# Patient Record
Sex: FEMALE | Race: BLACK OR AFRICAN AMERICAN | NOT HISPANIC OR LATINO
[De-identification: names, ages, dates, MRNs, and addresses within clinical notes are randomized per-mention and may not be internally consistent; named-entity substitution may affect disease eponyms.]

---

## 2017-01-09 ENCOUNTER — APPOINTMENT (OUTPATIENT)
Dept: OBGYN | Facility: CLINIC | Age: 52
End: 2017-01-09

## 2017-01-31 ENCOUNTER — APPOINTMENT (OUTPATIENT)
Dept: OBGYN | Facility: CLINIC | Age: 52
End: 2017-01-31

## 2017-02-03 ENCOUNTER — APPOINTMENT (OUTPATIENT)
Dept: OBGYN | Facility: CLINIC | Age: 52
End: 2017-02-03

## 2017-12-11 ENCOUNTER — APPOINTMENT (OUTPATIENT)
Dept: INTERNAL MEDICINE | Facility: CLINIC | Age: 52
End: 2017-12-11
Payer: COMMERCIAL

## 2017-12-11 ENCOUNTER — LABORATORY RESULT (OUTPATIENT)
Age: 52
End: 2017-12-11

## 2017-12-11 VITALS
WEIGHT: 209 LBS | BODY MASS INDEX: 33.59 KG/M2 | OXYGEN SATURATION: 99 % | DIASTOLIC BLOOD PRESSURE: 76 MMHG | HEART RATE: 80 BPM | HEIGHT: 66 IN | TEMPERATURE: 97.8 F | SYSTOLIC BLOOD PRESSURE: 136 MMHG

## 2017-12-11 DIAGNOSIS — Z86.2 PERSONAL HISTORY OF DISEASES OF THE BLOOD AND BLOOD-FORMING ORGANS AND CERTAIN DISORDERS INVOLVING THE IMMUNE MECHANISM: ICD-10-CM

## 2017-12-11 DIAGNOSIS — J45.901 UNSPECIFIED ASTHMA WITH (ACUTE) EXACERBATION: ICD-10-CM

## 2017-12-11 DIAGNOSIS — M25.473 EFFUSION, UNSPECIFIED ANKLE: ICD-10-CM

## 2017-12-11 DIAGNOSIS — Z86.79 PERSONAL HISTORY OF OTHER DISEASES OF THE CIRCULATORY SYSTEM: ICD-10-CM

## 2017-12-11 DIAGNOSIS — Z86.73 PERSONAL HISTORY OF TRANSIENT ISCHEMIC ATTACK (TIA), AND CEREBRAL INFARCTION W/OUT RESIDUAL DEFICITS: ICD-10-CM

## 2017-12-11 DIAGNOSIS — Z87.2 PERSONAL HISTORY OF DISEASES OF THE SKIN AND SUBCUTANEOUS TISSUE: ICD-10-CM

## 2017-12-11 DIAGNOSIS — Z78.9 OTHER SPECIFIED HEALTH STATUS: ICD-10-CM

## 2017-12-11 DIAGNOSIS — R68.89 OTHER GENERAL SYMPTOMS AND SIGNS: ICD-10-CM

## 2017-12-11 DIAGNOSIS — J45.909 UNSPECIFIED ASTHMA, UNCOMPLICATED: ICD-10-CM

## 2017-12-11 DIAGNOSIS — Z87.09 PERSONAL HISTORY OF OTHER DISEASES OF THE RESPIRATORY SYSTEM: ICD-10-CM

## 2017-12-11 PROBLEM — Z00.00 ENCOUNTER FOR PREVENTIVE HEALTH EXAMINATION: Noted: 2017-12-11

## 2017-12-11 PROCEDURE — 99204 OFFICE O/P NEW MOD 45 MIN: CPT | Mod: 25

## 2017-12-11 PROCEDURE — 87880 STREP A ASSAY W/OPTIC: CPT | Mod: QW

## 2017-12-11 PROCEDURE — 36415 COLL VENOUS BLD VENIPUNCTURE: CPT

## 2017-12-11 PROCEDURE — 87804 INFLUENZA ASSAY W/OPTIC: CPT | Mod: QW

## 2017-12-11 PROCEDURE — 94010 BREATHING CAPACITY TEST: CPT

## 2017-12-11 RX ORDER — ALBUTEROL 90 MCG
AEROSOL (GRAM) INHALATION
Refills: 0 | Status: ACTIVE | COMMUNITY

## 2017-12-12 LAB
ALBUMIN SERPL ELPH-MCNC: 4 G/DL
ALP BLD-CCNC: 68 U/L
ALT SERPL-CCNC: 13 U/L
ANION GAP SERPL CALC-SCNC: 13 MMOL/L
AST SERPL-CCNC: 18 U/L
BASOPHILS # BLD AUTO: 0.05 K/UL
BASOPHILS NFR BLD AUTO: 0.6 %
BILIRUB SERPL-MCNC: 0.6 MG/DL
BUN SERPL-MCNC: 10 MG/DL
CALCIUM SERPL-MCNC: 10 MG/DL
CHLORIDE SERPL-SCNC: 104 MMOL/L
CO2 SERPL-SCNC: 26 MMOL/L
CREAT SERPL-MCNC: 0.99 MG/DL
EOSINOPHIL # BLD AUTO: 0.42 K/UL
EOSINOPHIL NFR BLD AUTO: 5.1 %
ERYTHROCYTE [SEDIMENTATION RATE] IN BLOOD BY WESTERGREN METHOD: 61 MM/HR
FLUAV SPEC QL CULT: NEGATIVE
FLUBV AG SPEC QL IA: NEGATIVE
GLUCOSE SERPL-MCNC: 81 MG/DL
HCT VFR BLD CALC: 33.1 %
HGB BLD-MCNC: 10 G/DL
IMM GRANULOCYTES NFR BLD AUTO: 0.1 %
LYMPHOCYTES # BLD AUTO: 3.9 K/UL
LYMPHOCYTES NFR BLD AUTO: 46.9 %
MAN DIFF?: NORMAL
MCHC RBC-ENTMCNC: 22.6 PG
MCHC RBC-ENTMCNC: 30.2 GM/DL
MCV RBC AUTO: 74.9 FL
MONOCYTES # BLD AUTO: 0.49 K/UL
MONOCYTES NFR BLD AUTO: 5.9 %
NEUTROPHILS # BLD AUTO: 3.44 K/UL
NEUTROPHILS NFR BLD AUTO: 41.4 %
PLATELET # BLD AUTO: 404 K/UL
POTASSIUM SERPL-SCNC: 4.1 MMOL/L
PROT SERPL-MCNC: 8.2 G/DL
RBC # BLD: 4.42 M/UL
RBC # FLD: 16.8 %
S PYO AG SPEC QL IA: NEGATIVE
SAVE SPECIMEN: NORMAL
SODIUM SERPL-SCNC: 143 MMOL/L
WBC # FLD AUTO: 8.31 K/UL

## 2017-12-14 LAB — BACTERIA THROAT CULT: NORMAL

## 2017-12-18 ENCOUNTER — APPOINTMENT (OUTPATIENT)
Dept: INTERNAL MEDICINE | Facility: CLINIC | Age: 52
End: 2017-12-18
Payer: COMMERCIAL

## 2017-12-18 VITALS
SYSTOLIC BLOOD PRESSURE: 134 MMHG | HEART RATE: 80 BPM | OXYGEN SATURATION: 98 % | BODY MASS INDEX: 33.59 KG/M2 | TEMPERATURE: 97.6 F | WEIGHT: 209 LBS | HEIGHT: 66 IN | DIASTOLIC BLOOD PRESSURE: 82 MMHG

## 2017-12-18 DIAGNOSIS — Z09 ENCOUNTER FOR FOLLOW-UP EXAMINATION AFTER COMPLETED TREATMENT FOR CONDITIONS OTHER THAN MALIGNANT NEOPLASM: ICD-10-CM

## 2017-12-18 DIAGNOSIS — J45.909 UNSPECIFIED ASTHMA, UNCOMPLICATED: ICD-10-CM

## 2017-12-18 PROCEDURE — 99213 OFFICE O/P EST LOW 20 MIN: CPT

## 2017-12-18 RX ORDER — AZITHROMYCIN 250 MG/1
250 TABLET, FILM COATED ORAL
Qty: 1 | Refills: 0 | Status: DISCONTINUED | COMMUNITY
Start: 2017-12-11 | End: 2017-12-18

## 2017-12-18 RX ORDER — PREDNISONE 10 MG/1
10 TABLET ORAL
Qty: 10 | Refills: 0 | Status: DISCONTINUED | COMMUNITY
Start: 2017-12-11 | End: 2017-12-18

## 2018-01-22 ENCOUNTER — APPOINTMENT (OUTPATIENT)
Dept: OTOLARYNGOLOGY | Facility: CLINIC | Age: 53
End: 2018-01-22
Payer: COMMERCIAL

## 2018-01-22 VITALS
HEIGHT: 65.5 IN | HEART RATE: 77 BPM | DIASTOLIC BLOOD PRESSURE: 53 MMHG | BODY MASS INDEX: 33.42 KG/M2 | SYSTOLIC BLOOD PRESSURE: 116 MMHG | WEIGHT: 203 LBS

## 2018-01-22 DIAGNOSIS — Z86.2 PERSONAL HISTORY OF DISEASES OF THE BLOOD AND BLOOD-FORMING ORGANS AND CERTAIN DISORDERS INVOLVING THE IMMUNE MECHANISM: ICD-10-CM

## 2018-01-22 DIAGNOSIS — H60.392 OTHER INFECTIVE OTITIS EXTERNA, LEFT EAR: ICD-10-CM

## 2018-01-22 DIAGNOSIS — Z83.3 FAMILY HISTORY OF DIABETES MELLITUS: ICD-10-CM

## 2018-01-22 DIAGNOSIS — H61.23 IMPACTED CERUMEN, BILATERAL: ICD-10-CM

## 2018-01-22 DIAGNOSIS — Z87.09 PERSONAL HISTORY OF OTHER DISEASES OF THE RESPIRATORY SYSTEM: ICD-10-CM

## 2018-01-22 DIAGNOSIS — H92.09 OTALGIA, UNSPECIFIED EAR: ICD-10-CM

## 2018-01-22 PROCEDURE — 99203 OFFICE O/P NEW LOW 30 MIN: CPT

## 2018-12-14 ENCOUNTER — OUTPATIENT (OUTPATIENT)
Dept: OUTPATIENT SERVICES | Facility: HOSPITAL | Age: 53
LOS: 1 days | End: 2018-12-14
Payer: COMMERCIAL

## 2018-12-14 ENCOUNTER — APPOINTMENT (OUTPATIENT)
Dept: RADIOLOGY | Facility: CLINIC | Age: 53
End: 2018-12-14
Payer: COMMERCIAL

## 2018-12-14 DIAGNOSIS — Z98.89 OTHER SPECIFIED POSTPROCEDURAL STATES: Chronic | ICD-10-CM

## 2018-12-14 DIAGNOSIS — Z00.8 ENCOUNTER FOR OTHER GENERAL EXAMINATION: ICD-10-CM

## 2018-12-14 PROCEDURE — 71046 X-RAY EXAM CHEST 2 VIEWS: CPT

## 2018-12-14 PROCEDURE — 71046 X-RAY EXAM CHEST 2 VIEWS: CPT | Mod: 26

## 2018-12-20 ENCOUNTER — LABORATORY RESULT (OUTPATIENT)
Age: 53
End: 2018-12-20

## 2018-12-20 ENCOUNTER — APPOINTMENT (OUTPATIENT)
Dept: INFECTIOUS DISEASE | Facility: CLINIC | Age: 53
End: 2018-12-20
Payer: COMMERCIAL

## 2018-12-20 VITALS
SYSTOLIC BLOOD PRESSURE: 149 MMHG | DIASTOLIC BLOOD PRESSURE: 86 MMHG | HEART RATE: 81 BPM | TEMPERATURE: 97.7 F | HEIGHT: 65 IN | OXYGEN SATURATION: 98 %

## 2018-12-20 DIAGNOSIS — R50.9 FEVER, UNSPECIFIED: ICD-10-CM

## 2018-12-20 DIAGNOSIS — M13.0 POLYARTHRITIS, UNSPECIFIED: ICD-10-CM

## 2018-12-20 DIAGNOSIS — A69.20 LYME DISEASE, UNSPECIFIED: ICD-10-CM

## 2018-12-20 DIAGNOSIS — R53.83 OTHER FATIGUE: ICD-10-CM

## 2018-12-20 PROCEDURE — 99205 OFFICE O/P NEW HI 60 MIN: CPT

## 2018-12-26 LAB
A PHAGOCYTOPH IGG TITR SER IF: NORMAL TITER
ALBUMIN SERPL ELPH-MCNC: 4.5 G/DL
ALP BLD-CCNC: 75 U/L
ALT SERPL-CCNC: 16 U/L
ANA SER IF-ACNC: NEGATIVE
ANION GAP SERPL CALC-SCNC: 12 MMOL/L
AST SERPL-CCNC: 17 U/L
B BURGDOR AB SER QL IA: NEGATIVE
B BURGDOR AB SER-IMP: NEGATIVE
B BURGDOR IGG+IGM SER QL IB: NORMAL
B BURGDOR IGM PATRN SER IB-IMP: POSITIVE
B BURGDOR18/20KD IGM SER QL IB: NORMAL
B BURGDOR18KD IGG SER QL IB: NORMAL
B BURGDOR23KD IGG SER QL IB: NORMAL
B BURGDOR23KD IGM SER QL IB: PRESENT
B BURGDOR28KD AB SER QL IB: NORMAL
B BURGDOR28KD IGG SER QL IB: NORMAL
B BURGDOR30KD AB SER QL IB: NORMAL
B BURGDOR30KD IGG SER QL IB: NORMAL
B BURGDOR31KD IGG SER QL IB: NORMAL
B BURGDOR31KD IGM SER QL IB: NORMAL
B BURGDOR39KD IGG SER QL IB: NORMAL
B BURGDOR39KD IGM SER QL IB: PRESENT
B BURGDOR41KD IGG SER QL IB: NORMAL
B BURGDOR41KD IGM SER QL IB: NORMAL
B BURGDOR45KD AB SER QL IB: NORMAL
B BURGDOR45KD IGG SER QL IB: NORMAL
B BURGDOR58KD AB SER QL IB: NORMAL
B BURGDOR58KD IGG SER QL IB: NORMAL
B BURGDOR66KD IGG SER QL IB: NORMAL
B BURGDOR66KD IGM SER QL IB: NORMAL
B BURGDOR93KD IGG SER QL IB: NORMAL
B BURGDOR93KD IGM SER QL IB: NORMAL
B MICROTI IGG TITR SER: NORMAL TITER
BACTERIA BLD CULT: NORMAL
BACTERIA BLD CULT: NORMAL
BASOPHILS # BLD AUTO: 0.03 K/UL
BASOPHILS NFR BLD AUTO: 0.4 %
BILIRUB SERPL-MCNC: 0.6 MG/DL
BUN SERPL-MCNC: 9 MG/DL
C3 SERPL-MCNC: 186 MG/DL
C4 SERPL-MCNC: 46 MG/DL
CALCIUM SERPL-MCNC: 9.9 MG/DL
CHLORIDE SERPL-SCNC: 103 MMOL/L
CO2 SERPL-SCNC: 26 MMOL/L
CREAT SERPL-MCNC: 0.94 MG/DL
E CHAFFEENSIS IGG TITR SER IF: NORMAL TITER
EOSINOPHIL # BLD AUTO: 0.21 K/UL
EOSINOPHIL NFR BLD AUTO: 3 %
GLUCOSE SERPL-MCNC: 90 MG/DL
HCT VFR BLD CALC: 37.7 %
HGB BLD-MCNC: 12.1 G/DL
IMM GRANULOCYTES NFR BLD AUTO: 0.1 %
LYMPHOCYTES # BLD AUTO: 3.43 K/UL
LYMPHOCYTES NFR BLD AUTO: 49.1 %
MAN DIFF?: NORMAL
MCHC RBC-ENTMCNC: 26.1 PG
MCHC RBC-ENTMCNC: 32.1 GM/DL
MCV RBC AUTO: 81.4 FL
MONOCYTES # BLD AUTO: 0.37 K/UL
MONOCYTES NFR BLD AUTO: 5.3 %
NEUTROPHILS # BLD AUTO: 2.93 K/UL
NEUTROPHILS NFR BLD AUTO: 42.1 %
PLATELET # BLD AUTO: 308 K/UL
POTASSIUM SERPL-SCNC: 3.8 MMOL/L
PROT SERPL-MCNC: 8 G/DL
R RICKETTSI IGG CSF-ACNC: NEGATIVE
R RICKETTSI IGM CSF-ACNC: 0.71 INDEX
RBC # BLD: 4.63 M/UL
RBC # FLD: 16.2 %
RHEUMATOID FACT SER QL: 10 IU/ML
SODIUM SERPL-SCNC: 142 MMOL/L
WBC # FLD AUTO: 6.98 K/UL

## 2019-01-04 LAB — B BURGDOR DNA SPEC QL NAA+PROBE: NEGATIVE

## 2019-04-30 ENCOUNTER — APPOINTMENT (OUTPATIENT)
Dept: OBGYN | Facility: CLINIC | Age: 54
End: 2019-04-30
Payer: COMMERCIAL

## 2019-04-30 VITALS
BODY MASS INDEX: 34.66 KG/M2 | SYSTOLIC BLOOD PRESSURE: 143 MMHG | HEIGHT: 65 IN | DIASTOLIC BLOOD PRESSURE: 82 MMHG | HEART RATE: 72 BPM | WEIGHT: 208 LBS

## 2019-04-30 DIAGNOSIS — Z01.419 ENCOUNTER FOR GYNECOLOGICAL EXAMINATION (GENERAL) (ROUTINE) W/OUT ABNORMAL FINDINGS: ICD-10-CM

## 2019-04-30 DIAGNOSIS — Z12.31 ENCOUNTER FOR SCREENING MAMMOGRAM FOR MALIGNANT NEOPLASM OF BREAST: ICD-10-CM

## 2019-04-30 DIAGNOSIS — Z12.83 ENCOUNTER FOR SCREENING FOR MALIGNANT NEOPLASM OF SKIN: ICD-10-CM

## 2019-04-30 PROCEDURE — 99396 PREV VISIT EST AGE 40-64: CPT

## 2019-04-30 NOTE — HISTORY OF PRESENT ILLNESS
[Fair] : being in fair health [Last Mammogram ___] : Last Mammogram was [unfilled] [Last Bone Density ___] : Last bone density studies [unfilled] [Last Colonoscopy ___] : Last colonoscopy [unfilled] [Last Pap ___] : Last cervical pap smear was [unfilled] [Perimenopausal] : is perimenopausal [HPV Vaccine NA Due to Age] : HPV vaccine not available to patient due to age

## 2019-04-30 NOTE — PHYSICAL EXAM
[Awake] : awake [Alert] : alert [Acute Distress] : no acute distress [Mass] : no breast mass [Nipple Discharge] : no nipple discharge [Axillary LAD] : no axillary lymphadenopathy [Soft] : soft [Tender] : non tender [Oriented x3] : oriented to person, place, and time [Normal] : uterus [Scant] : there was scant vaginal bleeding [Uterine Adnexae] : were not tender and not enlarged [FreeTextEntry5] : stenotic

## 2019-04-30 NOTE — CHIEF COMPLAINT
[Annual Visit] : annual visit [FreeTextEntry1] : 58 yo sister stage 3 breast ca--3rd sister--other 2 in 40's\par Pt to see breast MD--interested in RRS

## 2019-05-01 LAB — HPV HIGH+LOW RISK DNA PNL CVX: NOT DETECTED

## 2019-05-05 LAB — CYTOLOGY CVX/VAG DOC THIN PREP: NORMAL

## 2019-09-02 PROBLEM — Z09 FOLLOW UP: Status: ACTIVE | Noted: 2017-12-18

## 2019-10-14 ENCOUNTER — INPATIENT (INPATIENT)
Facility: HOSPITAL | Age: 54
LOS: 4 days | Discharge: HOME | DRG: 563 | End: 2019-10-19
Attending: SURGERY | Admitting: SURGERY
Payer: COMMERCIAL

## 2019-10-14 VITALS
DIASTOLIC BLOOD PRESSURE: 98 MMHG | SYSTOLIC BLOOD PRESSURE: 186 MMHG | HEART RATE: 90 BPM | RESPIRATION RATE: 22 BRPM | OXYGEN SATURATION: 98 %

## 2019-10-14 DIAGNOSIS — Z98.89 OTHER SPECIFIED POSTPROCEDURAL STATES: Chronic | ICD-10-CM
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
## 2019-10-14 DIAGNOSIS — Z98.891 HISTORY OF UTERINE SCAR FROM PREVIOUS SURGERY: Chronic | ICD-10-CM

## 2019-10-14 LAB
ALBUMIN SERPL ELPH-MCNC: 4.1 G/DL — SIGNIFICANT CHANGE UP (ref 3.3–5)
ALP SERPL-CCNC: 77 U/L — SIGNIFICANT CHANGE UP (ref 40–120)
ALT FLD-CCNC: 21 U/L — SIGNIFICANT CHANGE UP (ref 10–45)
ANION GAP SERPL CALC-SCNC: 13 MMOL/L — SIGNIFICANT CHANGE UP (ref 5–17)
APPEARANCE UR: CLEAR — SIGNIFICANT CHANGE UP
APTT BLD: 28.6 SEC — SIGNIFICANT CHANGE UP (ref 27.5–36.3)
AST SERPL-CCNC: 29 U/L — SIGNIFICANT CHANGE UP (ref 10–40)
BASOPHILS # BLD AUTO: 0 K/UL — SIGNIFICANT CHANGE UP (ref 0–0.2)
BASOPHILS NFR BLD AUTO: 0 % — SIGNIFICANT CHANGE UP (ref 0–2)
BILIRUB SERPL-MCNC: 0.7 MG/DL — SIGNIFICANT CHANGE UP (ref 0.2–1.2)
BILIRUB UR-MCNC: NEGATIVE — SIGNIFICANT CHANGE UP
BUN SERPL-MCNC: 10 MG/DL — SIGNIFICANT CHANGE UP (ref 7–23)
CALCIUM SERPL-MCNC: 9.5 MG/DL — SIGNIFICANT CHANGE UP (ref 8.4–10.5)
CHLORIDE SERPL-SCNC: 104 MMOL/L — SIGNIFICANT CHANGE UP (ref 96–108)
CO2 SERPL-SCNC: 23 MMOL/L — SIGNIFICANT CHANGE UP (ref 22–31)
COLOR SPEC: COLORLESS — SIGNIFICANT CHANGE UP
CREAT SERPL-MCNC: 0.77 MG/DL — SIGNIFICANT CHANGE UP (ref 0.5–1.3)
DIFF PNL FLD: NEGATIVE — SIGNIFICANT CHANGE UP
EOSINOPHIL # BLD AUTO: 0.1 K/UL — SIGNIFICANT CHANGE UP (ref 0–0.5)
EOSINOPHIL NFR BLD AUTO: 1 % — SIGNIFICANT CHANGE UP (ref 0–6)
ETHANOL SERPL-MCNC: SIGNIFICANT CHANGE UP MG/DL (ref 0–10)
GAS PNL BLDV: SIGNIFICANT CHANGE UP
GLUCOSE BLDC GLUCOMTR-MCNC: 193 MG/DL — HIGH (ref 70–99)
GLUCOSE BLDC GLUCOMTR-MCNC: 248 MG/DL — HIGH (ref 70–99)
GLUCOSE SERPL-MCNC: 171 MG/DL — HIGH (ref 70–99)
GLUCOSE UR QL: NEGATIVE — SIGNIFICANT CHANGE UP
HCT VFR BLD CALC: 37.8 % — SIGNIFICANT CHANGE UP (ref 34.5–45)
HGB BLD-MCNC: 12.8 G/DL — SIGNIFICANT CHANGE UP (ref 11.5–15.5)
INR BLD: 1.04 RATIO — SIGNIFICANT CHANGE UP (ref 0.88–1.16)
KETONES UR-MCNC: NEGATIVE — SIGNIFICANT CHANGE UP
LEUKOCYTE ESTERASE UR-ACNC: NEGATIVE — SIGNIFICANT CHANGE UP
LIDOCAIN IGE QN: 17 U/L — SIGNIFICANT CHANGE UP (ref 7–60)
LYMPHOCYTES # BLD AUTO: 5.17 K/UL — HIGH (ref 1–3.3)
LYMPHOCYTES # BLD AUTO: 54 % — HIGH (ref 13–44)
MCHC RBC-ENTMCNC: 27.8 PG — SIGNIFICANT CHANGE UP (ref 27–34)
MCHC RBC-ENTMCNC: 33.9 GM/DL — SIGNIFICANT CHANGE UP (ref 32–36)
MCV RBC AUTO: 82.2 FL — SIGNIFICANT CHANGE UP (ref 80–100)
MONOCYTES # BLD AUTO: 0.67 K/UL — SIGNIFICANT CHANGE UP (ref 0–0.9)
MONOCYTES NFR BLD AUTO: 7 % — SIGNIFICANT CHANGE UP (ref 2–14)
NEUTROPHILS # BLD AUTO: 3.64 K/UL — SIGNIFICANT CHANGE UP (ref 1.8–7.4)
NEUTROPHILS NFR BLD AUTO: 38 % — LOW (ref 43–77)
NITRITE UR-MCNC: NEGATIVE — SIGNIFICANT CHANGE UP
PH UR: 7 — SIGNIFICANT CHANGE UP (ref 5–8)
PLATELET # BLD AUTO: 261 K/UL — SIGNIFICANT CHANGE UP (ref 150–400)
POTASSIUM SERPL-MCNC: 3.8 MMOL/L — SIGNIFICANT CHANGE UP (ref 3.5–5.3)
POTASSIUM SERPL-SCNC: 3.8 MMOL/L — SIGNIFICANT CHANGE UP (ref 3.5–5.3)
PROT SERPL-MCNC: 7.8 G/DL — SIGNIFICANT CHANGE UP (ref 6–8.3)
PROT UR-MCNC: NEGATIVE — SIGNIFICANT CHANGE UP
PROTHROM AB SERPL-ACNC: 12 SEC — SIGNIFICANT CHANGE UP (ref 10–12.9)
RBC # BLD: 4.6 M/UL — SIGNIFICANT CHANGE UP (ref 3.8–5.2)
RBC # FLD: 14.5 % — SIGNIFICANT CHANGE UP (ref 10.3–14.5)
SODIUM SERPL-SCNC: 140 MMOL/L — SIGNIFICANT CHANGE UP (ref 135–145)
SP GR SPEC: 1.04 — HIGH (ref 1.01–1.02)
UROBILINOGEN FLD QL: NEGATIVE — SIGNIFICANT CHANGE UP
WBC # BLD: 9.57 K/UL — SIGNIFICANT CHANGE UP (ref 3.8–10.5)
WBC # FLD AUTO: 9.57 K/UL — SIGNIFICANT CHANGE UP (ref 3.8–10.5)

## 2019-10-14 PROCEDURE — 73110 X-RAY EXAM OF WRIST: CPT | Mod: 26,LT

## 2019-10-14 PROCEDURE — 73564 X-RAY EXAM KNEE 4 OR MORE: CPT | Mod: 26,50

## 2019-10-14 PROCEDURE — 72125 CT NECK SPINE W/O DYE: CPT | Mod: 26

## 2019-10-14 PROCEDURE — 70450 CT HEAD/BRAIN W/O DYE: CPT | Mod: 26

## 2019-10-14 PROCEDURE — 74177 CT ABD & PELVIS W/CONTRAST: CPT | Mod: 26

## 2019-10-14 PROCEDURE — 71045 X-RAY EXAM CHEST 1 VIEW: CPT | Mod: 26

## 2019-10-14 PROCEDURE — 73610 X-RAY EXAM OF ANKLE: CPT | Mod: 26,LT,77

## 2019-10-14 PROCEDURE — 73030 X-RAY EXAM OF SHOULDER: CPT | Mod: 26,RT

## 2019-10-14 PROCEDURE — 73590 X-RAY EXAM OF LOWER LEG: CPT | Mod: 26,LT

## 2019-10-14 PROCEDURE — 72170 X-RAY EXAM OF PELVIS: CPT | Mod: 26

## 2019-10-14 PROCEDURE — 73130 X-RAY EXAM OF HAND: CPT | Mod: 26,LT

## 2019-10-14 PROCEDURE — 71260 CT THORAX DX C+: CPT | Mod: 26

## 2019-10-14 PROCEDURE — 73630 X-RAY EXAM OF FOOT: CPT | Mod: 26,LT

## 2019-10-14 PROCEDURE — 73610 X-RAY EXAM OF ANKLE: CPT | Mod: 26,LT

## 2019-10-14 RX ORDER — DEXTROSE 50 % IN WATER 50 %
25 SYRINGE (ML) INTRAVENOUS ONCE
Refills: 0 | Status: DISCONTINUED | OUTPATIENT
Start: 2019-10-14 | End: 2019-10-19

## 2019-10-14 RX ORDER — ACETAMINOPHEN 500 MG
975 TABLET ORAL EVERY 6 HOURS
Refills: 0 | Status: COMPLETED | OUTPATIENT
Start: 2019-10-14 | End: 2019-10-17

## 2019-10-14 RX ORDER — ENOXAPARIN SODIUM 100 MG/ML
40 INJECTION SUBCUTANEOUS DAILY
Refills: 0 | Status: DISCONTINUED | OUTPATIENT
Start: 2019-10-14 | End: 2019-10-19

## 2019-10-14 RX ORDER — INFLUENZA VIRUS VACCINE 15; 15; 15; 15 UG/.5ML; UG/.5ML; UG/.5ML; UG/.5ML
0.5 SUSPENSION INTRAMUSCULAR ONCE
Refills: 0 | Status: DISCONTINUED | OUTPATIENT
Start: 2019-10-14 | End: 2019-10-19

## 2019-10-14 RX ORDER — SODIUM CHLORIDE 9 MG/ML
1000 INJECTION, SOLUTION INTRAVENOUS
Refills: 0 | Status: DISCONTINUED | OUTPATIENT
Start: 2019-10-14 | End: 2019-10-15

## 2019-10-14 RX ORDER — ACETAMINOPHEN 500 MG
1000 TABLET ORAL ONCE
Refills: 0 | Status: COMPLETED | OUTPATIENT
Start: 2019-10-14 | End: 2019-10-14

## 2019-10-14 RX ORDER — IBUPROFEN 200 MG
400 TABLET ORAL EVERY 6 HOURS
Refills: 0 | Status: DISCONTINUED | OUTPATIENT
Start: 2019-10-14 | End: 2019-10-16

## 2019-10-14 RX ORDER — DEXTROSE 50 % IN WATER 50 %
12.5 SYRINGE (ML) INTRAVENOUS ONCE
Refills: 0 | Status: DISCONTINUED | OUTPATIENT
Start: 2019-10-14 | End: 2019-10-19

## 2019-10-14 RX ORDER — TETANUS TOXOID, REDUCED DIPHTHERIA TOXOID AND ACELLULAR PERTUSSIS VACCINE, ADSORBED 5; 2.5; 8; 8; 2.5 [IU]/.5ML; [IU]/.5ML; UG/.5ML; UG/.5ML; UG/.5ML
0.5 SUSPENSION INTRAMUSCULAR ONCE
Refills: 0 | Status: COMPLETED | OUTPATIENT
Start: 2019-10-14 | End: 2019-10-14

## 2019-10-14 RX ORDER — FENTANYL CITRATE 50 UG/ML
25 INJECTION INTRAVENOUS ONCE
Refills: 0 | Status: DISCONTINUED | OUTPATIENT
Start: 2019-10-14 | End: 2019-10-14

## 2019-10-14 RX ORDER — DEXTROSE 50 % IN WATER 50 %
15 SYRINGE (ML) INTRAVENOUS ONCE
Refills: 0 | Status: DISCONTINUED | OUTPATIENT
Start: 2019-10-14 | End: 2019-10-19

## 2019-10-14 RX ORDER — INSULIN LISPRO 100/ML
VIAL (ML) SUBCUTANEOUS
Refills: 0 | Status: DISCONTINUED | OUTPATIENT
Start: 2019-10-14 | End: 2019-10-19

## 2019-10-14 RX ORDER — GLUCAGON INJECTION, SOLUTION 0.5 MG/.1ML
1 INJECTION, SOLUTION SUBCUTANEOUS ONCE
Refills: 0 | Status: DISCONTINUED | OUTPATIENT
Start: 2019-10-14 | End: 2019-10-19

## 2019-10-14 RX ORDER — SODIUM CHLORIDE 9 MG/ML
1000 INJECTION INTRAMUSCULAR; INTRAVENOUS; SUBCUTANEOUS ONCE
Refills: 0 | Status: COMPLETED | OUTPATIENT
Start: 2019-10-14 | End: 2019-10-14

## 2019-10-14 RX ADMIN — Medication 975 MILLIGRAM(S): at 23:52

## 2019-10-14 RX ADMIN — Medication 2: at 23:50

## 2019-10-14 RX ADMIN — ENOXAPARIN SODIUM 40 MILLIGRAM(S): 100 INJECTION SUBCUTANEOUS at 23:50

## 2019-10-14 RX ADMIN — Medication 1000 MILLIGRAM(S): at 18:29

## 2019-10-14 RX ADMIN — Medication 400 MILLIGRAM(S): at 23:51

## 2019-10-14 RX ADMIN — SODIUM CHLORIDE 1000 MILLILITER(S): 9 INJECTION INTRAMUSCULAR; INTRAVENOUS; SUBCUTANEOUS at 17:03

## 2019-10-14 RX ADMIN — TETANUS TOXOID, REDUCED DIPHTHERIA TOXOID AND ACELLULAR PERTUSSIS VACCINE, ADSORBED 0.5 MILLILITER(S): 5; 2.5; 8; 8; 2.5 SUSPENSION INTRAMUSCULAR at 17:05

## 2019-10-14 RX ADMIN — FENTANYL CITRATE 25 MICROGRAM(S): 50 INJECTION INTRAVENOUS at 19:37

## 2019-10-14 NOTE — ED PROCEDURE NOTE - CPROC ED POST PROC CARE GUIDE1
Instructed patient/caregiver regarding signs and symptoms of infection./Keep the cast/splint/dressing clean and dry./Verbal/written post procedure instructions were given to patient/caregiver./Instructed patient/caregiver to follow-up with primary care physician.
Instructed patient/caregiver to follow-up with primary care physician./Verbal/written post procedure instructions were given to patient/caregiver./Instructed patient/caregiver regarding signs and symptoms of infection./Keep the cast/splint/dressing clean and dry.
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
## 2019-10-14 NOTE — ED PROVIDER NOTE - NS ED ROS FT
ROS: denies HA, weakness, dizziness, fevers/chills, nausea/vomiting, chest pain, SOB, diaphoresis, abdominal pain, diarrhea, neuro deficits, dysuria/hematuria, rash    +HA, RUE/LLE pain, R shoulder pain, bilateral knee pain

## 2019-10-14 NOTE — ED ADULT NURSE NOTE - NSIMPLEMENTINTERV_GEN_ALL_ED
Implemented All Fall Risk Interventions:  [place] to call system. Call bell, personal items and telephone within reach. Instruct patient to call for assistance. Room bathroom lighting operational. Non-slip footwear when patient is off stretcher. Physically safe environment: no spills, clutter or unnecessary equipment. Stretcher in lowest position, wheels locked, appropriate side rails in place. Provide visual cue, wrist band, yellow gown, etc. Monitor gait and stability. Monitor for mental status changes and reorient to person, place, and time. Review medications for side effects contributing to fall risk. Reinforce activity limits and safety measures with patient and family.
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
## 2019-10-14 NOTE — H&P ADULT - NSHPLABSRESULTS_GEN_ALL_CORE
< from: CT Abdomen and Pelvis w/ IV Cont (10.14.19 @ 16:57) >    CHEST:     LUNGS AND LARGE AIRWAYS: No endobronchial lesions. Mild right-sided   dependent subsegmental atelectasis.  PLEURA: No pleural effusion.  VESSELS: Within normal limits. No pulmonary embolism or aortic dissection.  HEART: Heart size is normal. No pericardial effusion.  MEDIASTINUM AND MONI: No lymphadenopathy.  CHEST WALL AND LOWER NECK: 1.1 x 1.6 cm right and 0.9 x 1.1 cm left   thyroid solid nodules.    ABDOMEN AND PELVIS:    LIVER: Within normal limits.  BILE DUCTS: Normal caliber.  GALLBLADDER: Within normal limits.  SPLEEN: Within normal limits.  PANCREAS: Mild nonspecific fat stranding near the head of the pancreas.  ADRENALS: Within Normal limits.  KIDNEYS/URETERS: Within normal limits.    BLADDER: Within normal limits.  REPRODUCTIVE ORGANS: Uterus and adnexa within normal limits.    BOWEL: No bowel obstruction. Appendix is normal.  PERITONEUM: No ascites.  VESSELS: Within normal limits.  RETROPERITONEUM/LYMPH NODES: No lymphadenopathy.    ABDOMINAL WALL: Within normal limits.  BONES: Degenerative changes.    IMPRESSION:     No acute thoracic or abdominal pathology.      < end of copied text >    < from: CT Head No Cont (10.14.19 @ 16:52) >    IMPRESSION:    HEAD CT:     No evidence for intracranial hemorrhage, mass effect or midline shift.     No displaced calvarial fracture. Large left occipital scalp hematoma with   adjacent subcutaneous emphysema, likely laceration.    CERVICAL SPINECT:     No evidence for acute displaced fracture or traumatic malalignment. Mild   degenerative changes.    < end of copied text >    < from: Xray Pelvis AP only (10.14.19 @ 17:07) >    INTERPRETATION:  no acute fracture or dislocation    < end of copied text >    < from: Xray Shoulder 2 Views, Right (10.14.19 @ 17:06) >    INTERPRETATION:  No acute fracture or dislocation.    < from: Xray Knee 4 Views, Bilateral (10.14.19 @ 17:05) >      INTERPRETATION:  cortical irregularity of the left fibular head -   question acute non dispaced left fibular head fracture    < from: Xray Hand 3 Views, Left (10.14.19 @ 17:03) >    IMPRESSION:   No acute displaced fracture or dislocation is seen in the left wrist.   There is mild soft tissue swelling overlying the ulnar styloid region.   Apparent irregularity at the waist of the scaphoid seen on oblique view   may be projectional. If there is clinical concern for scaphoid fracture,   dedicated scaphoid view is recommended. The joint space between the   lunate and triquetrum bone is not well profiled, and arthrosis/partial   coalition is not excluded. There are likely cystic changes in the lunate   and triquetrum. If clinically indicated, follow-up with cross-sectional   imaging can be performed. No acute displaced fracture or dislocation is   seen in the left hand.    < end of copied text >

## 2019-10-14 NOTE — ED PROCEDURE NOTE - CPROC ED TIME OUT STATEMENT1
“Patient's name, , procedure and correct site were confirmed during the [place] Timeout.”
“Patient's name, , procedure and correct site were confirmed during the [place] Timeout.”
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
## 2019-10-14 NOTE — ED PROVIDER NOTE - PHYSICAL EXAMINATION
Gen: GCS15, speaking in full sentences  Head: laceration occipital region  HEENT: PERRL, MMM, normal conjunctiva, anicteric, neck supple  Lung: CTAB, no adventitious sounds  CV: RRR, no murmurs, rubs or gallops  Abd: soft, NTND, no rebound or guarding, no CVAT  MSK: TTP over R clavicle / R shoulder, TTP bilateral knees, TTP over L fingers, L midfoot  Neuro: No focal neurologic deficits. CN II-XII grossly intact. 5/5 strength and normal sensation in all extremities.  Skin: Warm and dry, no evidence of rash  Psych: normal mood and affect

## 2019-10-14 NOTE — ED PROCEDURE NOTE - CPROC ED LACERATION CLEANSED1
removal of particulate matter/copious irrigation/extensive cleaning
copious irrigation/extensive cleaning/removal of particulate matter

## 2019-10-14 NOTE — ED PROCEDURE NOTE - CPROC ED LACER REPAIR DETAIL1
No foreign body/Multiple flaps were aligned./All foreign material was removed./The wound was explored to base in bloodless field./Non-extensive debridement was performed.
Multiple flaps were aligned./Non-extensive debridement was performed./The wound was explored to base in bloodless field./All foreign material was removed.

## 2019-10-14 NOTE — ED PROCEDURE NOTE - ATTENDING CONTRIBUTION TO CARE
***Saroj Rachel MD FACEP*** Attending physician was available for the key components of the procedure, the patient tolerated well. There were no complications with the procedure.
Dr. JO-ANN Panchal assisted as well.  ***Saroj Rachel MD FACEP*** Attending physician was available for the key components of the procedure, the patient tolerated well. There were no complications with the procedure.
Agree with above, Saroj Rachel MD, FACEP  ***Saroj Rachel MD FACEP*** Attending physician was available for the key components of the procedure, the patient tolerated well. There were no complications with the procedure.

## 2019-10-14 NOTE — ED PROCEDURE NOTE - NS ED ATTENDING STATEMENT MOD
I have personally seen and examined this patient.  I have fully participated in the care of this patient. I have reviewed all pertinent clinical information, including history, physical exam, plan and the Resident’s note and agree except as noted.
I have personally seen and examined this patient.  I have fully participated in the care of this patient. I have reviewed all pertinent clinical information, including history, physical exam, plan and the Medical Student and Resident’s note and agree except as noted.
I have personally seen and examined this patient.  I have fully participated in the care of this patient. I have reviewed all pertinent clinical information, including history, physical exam, plan and the Medical Student and Resident’s note and agree except as noted.

## 2019-10-14 NOTE — ED PROCEDURE NOTE - PROCEDURE ADDITIONAL DETAILS
Two lacerations were repaired, one measuring 3.o cm and the other measuring 1.0 cm. Both were repaired with staples as detailed above. Two lacerations were repaired, one measuring 3.0 cm and the other measuring 1.0 cm. Both were repaired with staples as detailed above.

## 2019-10-15 DIAGNOSIS — E11.9 TYPE 2 DIABETES MELLITUS WITHOUT COMPLICATIONS: ICD-10-CM

## 2019-10-15 DIAGNOSIS — S06.0X1A CONCUSSION WITH LOSS OF CONSCIOUSNESS OF 30 MINUTES OR LESS, INITIAL ENCOUNTER: ICD-10-CM
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
## 2019-10-15 LAB
ANION GAP SERPL CALC-SCNC: 13 MMOL/L — SIGNIFICANT CHANGE UP (ref 5–17)
BUN SERPL-MCNC: 9 MG/DL — SIGNIFICANT CHANGE UP (ref 7–23)
CALCIUM SERPL-MCNC: 9.1 MG/DL — SIGNIFICANT CHANGE UP (ref 8.4–10.5)
CHLORIDE SERPL-SCNC: 104 MMOL/L — SIGNIFICANT CHANGE UP (ref 96–108)
CO2 SERPL-SCNC: 25 MMOL/L — SIGNIFICANT CHANGE UP (ref 22–31)
CREAT SERPL-MCNC: 0.79 MG/DL — SIGNIFICANT CHANGE UP (ref 0.5–1.3)
GLUCOSE BLDC GLUCOMTR-MCNC: 124 MG/DL — HIGH (ref 70–99)
GLUCOSE BLDC GLUCOMTR-MCNC: 147 MG/DL — HIGH (ref 70–99)
GLUCOSE BLDC GLUCOMTR-MCNC: 210 MG/DL — HIGH (ref 70–99)
GLUCOSE BLDC GLUCOMTR-MCNC: 232 MG/DL — HIGH (ref 70–99)
GLUCOSE SERPL-MCNC: 118 MG/DL — HIGH (ref 70–99)
HBA1C BLD-MCNC: 6.8 % — HIGH (ref 4–5.6)
HCT VFR BLD CALC: 33.4 % — LOW (ref 34.5–45)
HCV AB S/CO SERPL IA: 0.15 S/CO — SIGNIFICANT CHANGE UP (ref 0–0.99)
HCV AB SERPL-IMP: SIGNIFICANT CHANGE UP
HGB BLD-MCNC: 11 G/DL — LOW (ref 11.5–15.5)
MAGNESIUM SERPL-MCNC: 1.7 MG/DL — SIGNIFICANT CHANGE UP (ref 1.6–2.6)
MCHC RBC-ENTMCNC: 27.6 PG — SIGNIFICANT CHANGE UP (ref 27–34)
MCHC RBC-ENTMCNC: 32.9 GM/DL — SIGNIFICANT CHANGE UP (ref 32–36)
MCV RBC AUTO: 83.9 FL — SIGNIFICANT CHANGE UP (ref 80–100)
PHOSPHATE SERPL-MCNC: 3.4 MG/DL — SIGNIFICANT CHANGE UP (ref 2.5–4.5)
PLATELET # BLD AUTO: 238 K/UL — SIGNIFICANT CHANGE UP (ref 150–400)
POTASSIUM SERPL-MCNC: 3.8 MMOL/L — SIGNIFICANT CHANGE UP (ref 3.5–5.3)
POTASSIUM SERPL-SCNC: 3.8 MMOL/L — SIGNIFICANT CHANGE UP (ref 3.5–5.3)
RBC # BLD: 3.98 M/UL — SIGNIFICANT CHANGE UP (ref 3.8–5.2)
RBC # FLD: 14.8 % — HIGH (ref 10.3–14.5)
SODIUM SERPL-SCNC: 142 MMOL/L — SIGNIFICANT CHANGE UP (ref 135–145)
WBC # BLD: 8.89 K/UL — SIGNIFICANT CHANGE UP (ref 3.8–10.5)
WBC # FLD AUTO: 8.89 K/UL — SIGNIFICANT CHANGE UP (ref 3.8–10.5)

## 2019-10-15 PROCEDURE — 70551 MRI BRAIN STEM W/O DYE: CPT | Mod: 26

## 2019-10-15 PROCEDURE — 73552 X-RAY EXAM OF FEMUR 2/>: CPT | Mod: 26,RT

## 2019-10-15 PROCEDURE — 73110 X-RAY EXAM OF WRIST: CPT | Mod: 26,LT

## 2019-10-15 PROCEDURE — 73721 MRI JNT OF LWR EXTRE W/O DYE: CPT | Mod: 26,RT

## 2019-10-15 PROCEDURE — 73590 X-RAY EXAM OF LOWER LEG: CPT | Mod: 26,RT

## 2019-10-15 PROCEDURE — 73010 X-RAY EXAM OF SHOULDER BLADE: CPT | Mod: 26

## 2019-10-15 RX ORDER — ACETAMINOPHEN 500 MG
1000 TABLET ORAL ONCE
Refills: 0 | Status: COMPLETED | OUTPATIENT
Start: 2019-10-15 | End: 2019-10-15

## 2019-10-15 RX ORDER — MAGNESIUM SULFATE 500 MG/ML
2 VIAL (ML) INJECTION ONCE
Refills: 0 | Status: COMPLETED | OUTPATIENT
Start: 2019-10-15 | End: 2019-10-15

## 2019-10-15 RX ORDER — LIDOCAINE 4 G/100G
1 CREAM TOPICAL EVERY 24 HOURS
Refills: 0 | Status: DISCONTINUED | OUTPATIENT
Start: 2019-10-15 | End: 2019-10-16

## 2019-10-15 RX ORDER — ONDANSETRON 8 MG/1
4 TABLET, FILM COATED ORAL ONCE
Refills: 0 | Status: DISCONTINUED | OUTPATIENT
Start: 2019-10-15 | End: 2019-10-19

## 2019-10-15 RX ORDER — SODIUM CHLORIDE 9 MG/ML
1000 INJECTION, SOLUTION INTRAVENOUS
Refills: 0 | Status: COMPLETED | OUTPATIENT
Start: 2019-10-15 | End: 2019-10-15

## 2019-10-15 RX ADMIN — LIDOCAINE 1 PATCH: 4 CREAM TOPICAL at 13:30

## 2019-10-15 RX ADMIN — Medication 400 MILLIGRAM(S): at 14:06

## 2019-10-15 RX ADMIN — Medication 400 MILLIGRAM(S): at 13:36

## 2019-10-15 RX ADMIN — Medication 975 MILLIGRAM(S): at 06:36

## 2019-10-15 RX ADMIN — SODIUM CHLORIDE 500 MILLILITER(S): 9 INJECTION, SOLUTION INTRAVENOUS at 11:48

## 2019-10-15 RX ADMIN — Medication 400 MILLIGRAM(S): at 21:38

## 2019-10-15 RX ADMIN — Medication 400 MILLIGRAM(S): at 06:36

## 2019-10-15 RX ADMIN — LIDOCAINE 1 PATCH: 4 CREAM TOPICAL at 19:41

## 2019-10-15 RX ADMIN — Medication 400 MILLIGRAM(S): at 06:06

## 2019-10-15 RX ADMIN — Medication 975 MILLIGRAM(S): at 12:20

## 2019-10-15 RX ADMIN — ENOXAPARIN SODIUM 40 MILLIGRAM(S): 100 INJECTION SUBCUTANEOUS at 11:49

## 2019-10-15 RX ADMIN — Medication 975 MILLIGRAM(S): at 00:22

## 2019-10-15 RX ADMIN — Medication 2: at 22:43

## 2019-10-15 RX ADMIN — Medication 975 MILLIGRAM(S): at 06:06

## 2019-10-15 RX ADMIN — Medication 400 MILLIGRAM(S): at 00:22

## 2019-10-15 RX ADMIN — Medication 1000 MILLIGRAM(S): at 22:08

## 2019-10-15 RX ADMIN — Medication 975 MILLIGRAM(S): at 11:50

## 2019-10-15 RX ADMIN — Medication 50 GRAM(S): at 10:38

## 2019-10-15 NOTE — CONSULT NOTE ADULT - ATTENDING COMMENTS
MRI reviewed, negative other than for a small pituitary lesion.     Patients symptoms most consistent with mild concussive injury with post concussive syndrome.     Will have the her follow up [facility] [address] [place] [address]., information given to the patient.     C/w tylenol or toradol PRN for headache.   Pt counseled to hold off of intense work for the next few weeks, and then gradually return    Avoid prolonged periods of time in front of computer or screens.
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
## 2019-10-15 NOTE — OCCUPATIONAL THERAPY INITIAL EVALUATION ADULT - PLANNED THERAPY INTERVENTIONS, OT EVAL
ROM/strengthening/ADL retraining/bed mobility training/IADL retraining/balance training/cognitive, visual perceptual/transfer training
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
## 2019-10-15 NOTE — OCCUPATIONAL THERAPY INITIAL EVALUATION ADULT - LIVES WITH, PROFILE
daughter home on weekends, lives in a house with 5STE, 12-13 to 2nd floor, can have 1st floor setup, +tub, +standard toilet. independent PTA, +driving, +working/alone/children

## 2019-10-15 NOTE — PROGRESS NOTE ADULT - SUBJECTIVE AND OBJECTIVE BOX
Surgery Daily Progress Note     52yo Female    --------------------------------------------------------------------------------------------------------------------  SUBJECTIVE / 24H EVENTS  - Patient seen and examined on morning rounds.   - knee immobilizer removed this morning by ortho  - endorsing generalized back pain, as well as pain in her left hand and left lower leg      OBJECTIVE:    VITAL SIGNS:  T(C): 36.8 (10-15-19 @ 07:30), Max: 37.3 (10-15-19 @ 04:08)  HR: 81 (10-15-19 @ 07:30) (74 - 102)  BP: 151/82 (10-15-19 @ 04:08) (139/75 - 186/98)  RR: 16 (10-15-19 @ 07:30) (16 - 22)  SpO2: 99% (10-15-19 @ 07:30) (96% - 100%)  Daily Height in cm: 167.64 (14 Oct 2019 23:33)    Daily   POCT Blood Glucose.: 147 mg/dL (10-15-19 @ 08:57)  POCT Blood Glucose.: 248 mg/dL (10-14-19 @ 23:23)  POCT Blood Glucose.: 193 mg/dL (10-14-19 @ 22:38)      PHYSICAL EXAM:  Gen: NAD  Resp: Respirations unlabored.  Card: RRR.  GI: Soft. Nontender. Nondistended.  Ext: Warm, well perfused        LAB VALUES:  10-15    142  |  104  |  9   ----------------------------<  118<H>  3.8   |  25  |  0.79    Ca    9.1      15 Oct 2019 07:24  Phos  3.4     10-15  Mg     1.7     10-15    TPro  7.8  /  Alb  4.1  /  TBili  0.7  /  DBili  x   /  AST  29  /  ALT  21  /  AlkPhos  77  10-14                               11.0   8.89  )-----------( 238      ( 15 Oct 2019 09:12 )             33.4     LIVER FUNCTIONS - ( 14 Oct 2019 16:12 )  Alb: 4.1 g/dL / Pro: 7.8 g/dL / ALK PHOS: 77 U/L / ALT: 21 U/L / AST: 29 U/L / GGT: x           PT/INR - ( 14 Oct 2019 16:12 )   PT: 12.0 sec;   INR: 1.04 ratio         PTT - ( 14 Oct 2019 16:12 )  PTT:28.6 sec        Urinalysis Basic - ( 14 Oct 2019 21:22 )    Color: Colorless / Appearance: Clear / S.036 / pH: x  Gluc: x / Ketone: Negative  / Bili: Negative / Urobili: Negative   Blood: x / Protein: Negative / Nitrite: Negative   Leuk Esterase: Negative / RBC: x / WBC x   Sq Epi: x / Non Sq Epi: x / Bacteria: x        MICROBIOLOGY:      RADIOLOGY:  PACS Image: Image(s) Available (10-14-19 @ 21:28)  PACS Image: Image(s) Available (10-14-19 @ 21:05)  PACS Image: Image(s) Available (10-14-19 @ 21:01)  PACS Image: Image(s) Available (10-14-19 @ 17:07)  PACS Image: Image(s) Available (10-14-19 @ 17:06)  PACS Image: Image(s) Available (10-14-19 @ 17:05)  PACS Image: Image(s) Available (10-14-19 @ 17:03)  PACS Image: Image(s) Available (10-14-19 @ 17:03)  PACS Image: Image(s) Available (10-14-19 @ 16:57)  PACS Image: Image(s) Available (10-14-19 @ 16:52)  PACS Image: Image(s) Available (10-14-19 @ 16:52)  PACS Image: Image(s) Available (10-14-19 @ 16:38)  PACS Image: Image(s) Available (10-14-19 @ 16:03)  PACS Image: Image(s) Available (10-14-19 @ 15:40)        MEDICATIONS  (STANDING):  acetaminophen   Tablet .. 975 milliGRAM(s) Oral every 6 hours  dextrose 50% Injectable 12.5 Gram(s) IV Push once  dextrose 50% Injectable 25 Gram(s) IV Push once  dextrose 50% Injectable 25 Gram(s) IV Push once  enoxaparin Injectable 40 milliGRAM(s) SubCutaneous daily  ibuprofen  Tablet. 400 milliGRAM(s) Oral every 6 hours  influenza   Vaccine 0.5 milliLiter(s) IntraMuscular once  insulin lispro (HumaLOG) corrective regimen sliding scale   SubCutaneous Before meals and at bedtime  magnesium sulfate  IVPB 2 Gram(s) IV Intermittent once    MEDICATIONS  (PRN):  dextrose 40% Gel 15 Gram(s) Oral once PRN Blood Glucose LESS THAN 70 milliGRAM(s)/deciliter  glucagon  Injectable 1 milliGRAM(s) IntraMuscular once PRN Glucose LESS THAN 70 milligrams/deciliter Surgery Daily Progress Note     54yo Female    --------------------------------------------------------------------------------------------------------------------  SUBJECTIVE / 24H EVENTS  - Patient seen and examined on morning rounds.   - knee immobilizer removed this morning by ortho  - endorsing generalized back pain, as well as pain in her left hand and left lower leg  - denies dizziness, SOB, confusion      OBJECTIVE:    VITAL SIGNS:  T(C): 36.8 (10-15-19 @ 07:30), Max: 37.3 (10-15-19 @ 04:08)  HR: 81 (10-15-19 @ 07:30) (74 - 102)  BP: 151/82 (10-15-19 @ 04:08) (139/75 - 186/98)  RR: 16 (10-15-19 @ 07:30) (16 - 22)  SpO2: 99% (10-15-19 @ 07:30) (96% - 100%)  Daily Height in cm: 167.64 (14 Oct 2019 23:33)    Daily   POCT Blood Glucose.: 147 mg/dL (10-15-19 @ 08:57)  POCT Blood Glucose.: 248 mg/dL (10-14-19 @ 23:23)  POCT Blood Glucose.: 193 mg/dL (10-14-19 @ 22:38)      PHYSICAL EXAM:  Gen: NAD, A&O x4, mentating appropriately  Resp: Respirations unlabored.  Card: RRR.  GI: Soft. Nontender. Nondistended.  Ext: Warm, well perfused. Left hand with superficial abrasion, swollen. Left lower leg tender to palpation        LAB VALUES:  10-15    142  |  104  |  9   ----------------------------<  118<H>  3.8   |  25  |  0.79    Ca    9.1      15 Oct 2019 07:24  Phos  3.4     10-15  Mg     1.7     10-15    TPro  7.8  /  Alb  4.1  /  TBili  0.7  /  DBili  x   /  AST  29  /  ALT  21  /  AlkPhos  77  10-14                               11.0   8.89  )-----------( 238      ( 15 Oct 2019 09:12 )             33.4     LIVER FUNCTIONS - ( 14 Oct 2019 16:12 )  Alb: 4.1 g/dL / Pro: 7.8 g/dL / ALK PHOS: 77 U/L / ALT: 21 U/L / AST: 29 U/L / GGT: x           PT/INR - ( 14 Oct 2019 16:12 )   PT: 12.0 sec;   INR: 1.04 ratio         PTT - ( 14 Oct 2019 16:12 )  PTT:28.6 sec        Urinalysis Basic - ( 14 Oct 2019 21:22 )    Color: Colorless / Appearance: Clear / S.036 / pH: x  Gluc: x / Ketone: Negative  / Bili: Negative / Urobili: Negative   Blood: x / Protein: Negative / Nitrite: Negative   Leuk Esterase: Negative / RBC: x / WBC x   Sq Epi: x / Non Sq Epi: x / Bacteria: x        MICROBIOLOGY:      RADIOLOGY:  PACS Image: Image(s) Available (10-14-19 @ 21:28)  PACS Image: Image(s) Available (10-14-19 @ 21:05)  PACS Image: Image(s) Available (10-14-19 @ 21:01)  PACS Image: Image(s) Available (10-14-19 @ 17:07)  PACS Image: Image(s) Available (10-14-19 @ 17:06)  PACS Image: Image(s) Available (10-14-19 @ 17:05)  PACS Image: Image(s) Available (10-14-19 @ 17:03)  PACS Image: Image(s) Available (10-14-19 @ 17:03)  PACS Image: Image(s) Available (10-14-19 @ 16:57)  PACS Image: Image(s) Available (10-14-19 @ 16:52)  PACS Image: Image(s) Available (10-14-19 @ 16:52)  PACS Image: Image(s) Available (10-14-19 @ 16:38)  PACS Image: Image(s) Available (10-14-19 @ 16:03)  PACS Image: Image(s) Available (10-14-19 @ 15:40)        MEDICATIONS  (STANDING):  acetaminophen   Tablet .. 975 milliGRAM(s) Oral every 6 hours  dextrose 50% Injectable 12.5 Gram(s) IV Push once  dextrose 50% Injectable 25 Gram(s) IV Push once  dextrose 50% Injectable 25 Gram(s) IV Push once  enoxaparin Injectable 40 milliGRAM(s) SubCutaneous daily  ibuprofen  Tablet. 400 milliGRAM(s) Oral every 6 hours  influenza   Vaccine 0.5 milliLiter(s) IntraMuscular once  insulin lispro (HumaLOG) corrective regimen sliding scale   SubCutaneous Before meals and at bedtime  magnesium sulfate  IVPB 2 Gram(s) IV Intermittent once    MEDICATIONS  (PRN):  dextrose 40% Gel 15 Gram(s) Oral once PRN Blood Glucose LESS THAN 70 milliGRAM(s)/deciliter  glucagon  Injectable 1 milliGRAM(s) IntraMuscular once PRN Glucose LESS THAN 70 milligrams/deciliter

## 2019-10-15 NOTE — CONSULT NOTE ADULT - PROBLEM SELECTOR RECOMMENDATION 9
continue current ,management as per trauma an orthopedics  ? abx for lacerations  ? tetanus  pain management

## 2019-10-15 NOTE — PROGRESS NOTE ADULT - SUBJECTIVE AND OBJECTIVE BOX
Subjective: Patient seen and examined at bedside. Pain is controlled. Pt complaining of head pain and some generalized back pain for which she received Tylenol. Denies acute neurological deficits. Denies ongoing dizziness/vertigo, shortness of breath, or confusion. Knee immobilizer removed at bedside this morning    Objective:  Vital Signs Last 24 Hrs  T(C): 37.3 (15 Oct 2019 04:08), Max: 37.3 (15 Oct 2019 04:08)  T(F): 99.1 (15 Oct 2019 04:08), Max: 99.1 (15 Oct 2019 04:08)  HR: 81 (15 Oct 2019 04:08) (74 - 102)  BP: 151/82 (15 Oct 2019 04:08) (139/75 - 186/98)  BP(mean): --  RR: 16 (15 Oct 2019 04:08) (16 - 22)  SpO2: 100% (15 Oct 2019 04:08) (96% - 100%)                          12.8   9.57  )-----------( 261      ( 14 Oct 2019 16:12 )             37.8     10-14    140  |  104  |  10  ----------------------------<  171<H>  3.8   |  23  |  0.77    Ca    9.5      14 Oct 2019 16:12    TPro  7.8  /  Alb  4.1  /  TBili  0.7  /  DBili  x   /  AST  29  /  ALT  21  /  AlkPhos  77  10-14      PHYSICAL EXAM:  Gen: NAD, AAOx3  Left Lower Extremity:  In KI, removed  +EHL/FHL/TA/GS  SILT L3-S1  +DP/PT Pulses  Compartments soft  No calf TTP B/L
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
## 2019-10-16 ENCOUNTER — TRANSCRIPTION ENCOUNTER (OUTPATIENT)
Age: 54
End: 2019-10-16
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
## 2019-10-16 DIAGNOSIS — D64.89 OTHER SPECIFIED ANEMIAS: ICD-10-CM

## 2019-10-16 LAB
GLUCOSE BLDC GLUCOMTR-MCNC: 115 MG/DL — HIGH (ref 70–99)
GLUCOSE BLDC GLUCOMTR-MCNC: 129 MG/DL — HIGH (ref 70–99)
GLUCOSE BLDC GLUCOMTR-MCNC: 135 MG/DL — HIGH (ref 70–99)
GLUCOSE BLDC GLUCOMTR-MCNC: 136 MG/DL — HIGH (ref 70–99)
GLUCOSE BLDC GLUCOMTR-MCNC: 175 MG/DL — HIGH (ref 70–99)

## 2019-10-16 PROCEDURE — 99222 1ST HOSP IP/OBS MODERATE 55: CPT | Mod: GC

## 2019-10-16 RX ORDER — LIDOCAINE 4 G/100G
2 CREAM TOPICAL EVERY 24 HOURS
Refills: 0 | Status: DISCONTINUED | OUTPATIENT
Start: 2019-10-16 | End: 2019-10-17

## 2019-10-16 RX ORDER — KETOROLAC TROMETHAMINE 30 MG/ML
15 SYRINGE (ML) INJECTION EVERY 8 HOURS
Refills: 0 | Status: DISCONTINUED | OUTPATIENT
Start: 2019-10-16 | End: 2019-10-17

## 2019-10-16 RX ADMIN — ENOXAPARIN SODIUM 40 MILLIGRAM(S): 100 INJECTION SUBCUTANEOUS at 12:25

## 2019-10-16 RX ADMIN — Medication 15 MILLIGRAM(S): at 17:48

## 2019-10-16 RX ADMIN — LIDOCAINE 1 PATCH: 4 CREAM TOPICAL at 01:12

## 2019-10-16 RX ADMIN — Medication 975 MILLIGRAM(S): at 06:08

## 2019-10-16 RX ADMIN — Medication 975 MILLIGRAM(S): at 06:38

## 2019-10-16 RX ADMIN — Medication 975 MILLIGRAM(S): at 12:25

## 2019-10-16 RX ADMIN — Medication 1: at 18:33

## 2019-10-16 RX ADMIN — Medication 15 MILLIGRAM(S): at 18:15

## 2019-10-16 RX ADMIN — LIDOCAINE 1 PATCH: 4 CREAM TOPICAL at 17:30

## 2019-10-16 RX ADMIN — LIDOCAINE 1 PATCH: 4 CREAM TOPICAL at 12:25

## 2019-10-16 RX ADMIN — Medication 975 MILLIGRAM(S): at 13:00
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
## 2019-10-16 NOTE — PROGRESS NOTE ADULT - SUBJECTIVE AND OBJECTIVE BOX
Surgery Daily Progress Note     54yo Female    --------------------------------------------------------------------------------------------------------------------  SUBJECTIVE / 24H EVENTS  - Patient seen and examined on morning rounds.   - denies headache  - continues to endorse generalized back pain, as well as pain in her left hand and left lower leg  - denies dizziness, SOB, confusion  - MRI leg, brain performed yesterday      OBJECTIVE:    VITAL SIGNS:  Vital Signs Last 24 Hrs  T(C): 36.8 (16 Oct 2019 07:46), Max: 37 (16 Oct 2019 01:35)  T(F): 98.2 (16 Oct 2019 07:46), Max: 98.6 (16 Oct 2019 01:35)  HR: 86 (16 Oct 2019 07:46) (79 - 92)  BP: 131/84 (16 Oct 2019 07:46) (131/84 - 166/87)  BP(mean): --  RR: 18 (16 Oct 2019 07:46) (18 - 18)  SpO2: 93% (16 Oct 2019 07:46) (93% - 98%)    I&O's Detail    15 Oct 2019 07:01  -  16 Oct 2019 07:00  --------------------------------------------------------  IN:    Oral Fluid: 1220 mL  Total IN: 1220 mL    OUT:    Voided: 2050 mL  Total OUT: 2050 mL    Total NET: -830 mL        PHYSICAL EXAM:  Gen: NAD, A&O x4, mentating appropriately  Resp: Respirations unlabored.  Card: RRR.  GI: Soft. Nontender. Nondistended.  Ext: Warm, well perfused. Left hand with superficial abrasion, swelling improved. Left lower leg tender to palpation        LAB VALUES:                          11.0   8.89  )-----------( 238      ( 15 Oct 2019 09:12 )             33.4     10-15    142  |  104  |  9   ----------------------------<  118<H>  3.8   |  25  |  0.79    Ca    9.1      15 Oct 2019 07:24  Phos  3.4     10-15  Mg     1.7     10-15    TPro  7.8  /  Alb  4.1  /  TBili  0.7  /  DBili  x   /  AST  29  /  ALT  21  /  AlkPhos  77  10-14        MICROBIOLOGY:      RADIOLOGY: RIGHT KNEE  < from: MR Knee No Cont, Right (10.15.19 @ 17:28) >  IMPRESSION:   No fracture.  Extensive edema and fluid within the subcutaneous tissues along the   lateral knee extending anteriorly to posteriorly and along the   anteromedial knee. Likely shearing injury along the superficial aspect of   the iliotibial band at the level of the lateral femoral condyle within   the subcutaneous tissues, suggestive of a Davies Layla type injury.   Edema within the partially imaged distal vastus lateralis, likely   representing a contusion.  Horizontal undersurface tearing of the posterior horn of the medial   meniscus.    < end of copied text >          MEDICATIONS  (STANDING):  acetaminophen   Tablet .. 975 milliGRAM(s) Oral every 6 hours  dextrose 50% Injectable 12.5 Gram(s) IV Push once  dextrose 50% Injectable 25 Gram(s) IV Push once  dextrose 50% Injectable 25 Gram(s) IV Push once  enoxaparin Injectable 40 milliGRAM(s) SubCutaneous daily  ibuprofen  Tablet. 400 milliGRAM(s) Oral every 6 hours  influenza   Vaccine 0.5 milliLiter(s) IntraMuscular once  insulin lispro (HumaLOG) corrective regimen sliding scale   SubCutaneous Before meals and at bedtime  magnesium sulfate  IVPB 2 Gram(s) IV Intermittent once    MEDICATIONS  (PRN):  dextrose 40% Gel 15 Gram(s) Oral once PRN Blood Glucose LESS THAN 70 milliGRAM(s)/deciliter  glucagon  Injectable 1 milliGRAM(s) IntraMuscular once PRN Glucose LESS THAN 70 milligrams/deciliter

## 2019-10-16 NOTE — DISCHARGE NOTE PROVIDER - NSDCFUADDAPPT_GEN_ALL_CORE_FT
FU with Dr. Long in the office in 1-2 weeks [phone] Follow up with Dr. Long (Orthopaedic Surgeon) in the office in 1-2 weeks. Please call [phone].   If there is no improvement in R shoulder pain/motion, you will require MRI of R shoulder, follow up results with Dr. Long.
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
## 2019-10-16 NOTE — DISCHARGE NOTE PROVIDER - NSDCFUADDINST_GEN_ALL_CORE_FT
Arm sutures to be removed in 1 week by Dr. Mota. Arm sutures to be removed in 1 week by Dr. Mota.    Weight bearing as tolerated to left and right leg

## 2019-10-16 NOTE — PROGRESS NOTE ADULT - SUBJECTIVE AND OBJECTIVE BOX
Subjective: Patient seen and examined at bedside. Complains of R shoulder, R knee, and L knee pain. MRI R knee yesterday shows fluid collection over lateral condyle/subcutaneous tissue. Tolerating diet.     Objective:  Vital Signs Last 24 Hrs  T(C): 36.9 (16 Oct 2019 05:18), Max: 37 (16 Oct 2019 01:35)  T(F): 98.5 (16 Oct 2019 05:18), Max: 98.6 (16 Oct 2019 01:35)  HR: 85 (16 Oct 2019 05:18) (79 - 92)  BP: 136/92 (16 Oct 2019 05:18) (136/92 - 166/87)  BP(mean): --  RR: 18 (16 Oct 2019 05:18) (16 - 18)  SpO2: 98% (16 Oct 2019 05:18) (96% - 99%)                          11.0   8.89  )-----------( 238      ( 15 Oct 2019 09:12 )             33.4   10-15    142  |  104  |  9   ----------------------------<  118<H>  3.8   |  25  |  0.79    Ca    9.1      15 Oct 2019 07:24  Phos  3.4     10-15  Mg     1.7     10-15    TPro  7.8  /  Alb  4.1  /  TBili  0.7  /  DBili  x   /  AST  29  /  ALT  21  /  AlkPhos  77  10-14        PHYSICAL EXAM:  Gen: NAD, AAOx3    Left Lower Extremity:  Skin intact  +EHL/FHL/TA/GS  SILT S/DP/Gonzalez/Sap/T  +DP/PT Pulses  Compartments soft  TTP about proximal leg    Right lower Extremity:  Skin intact  +EHL/FHL/TA/GS  SILT S/DP/Gonzalez/Sap/T  +DP/PT Pulses  Compartments soft  TTP about lateral knee    RUE:  Skin intact  SILT M/R/U/Ax  Fires AIN/PIN/U/M/R/Ms/Ax  Pain with shoulder ROM
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
## 2019-10-16 NOTE — PHYSICAL THERAPY INITIAL EVALUATION ADULT - LEVEL OF CONSCIOUSNESS, REHAB EVAL
pt unable to count down by 3's, pt can spell WORLD backwards and can name the president, appears emotionally labile/alert

## 2019-10-16 NOTE — DISCHARGE NOTE PROVIDER - PROVIDER TOKENS
PROVIDER:[TOKEN:[[id number]]] PROVIDER:[TOKEN:[[id number]]],PROVIDER:[TOKEN:[[id number]],FOLLOWUP:[2 weeks]],PROVIDER:[TOKEN:[[id number]],FOLLOWUP:[1 week]]

## 2019-10-16 NOTE — DISCHARGE NOTE PROVIDER - NSDCACTIVITY_GEN_ALL_CORE
Stairs allowed/Walking - Indoors allowed/Do not make important decisions/No heavy lifting/straining/Walking - Outdoors allowed/Do not drive or operate machinery/Showering allowed

## 2019-10-16 NOTE — DISCHARGE NOTE PROVIDER - CARE PROVIDERS DIRECT ADDRESSES
,DirectAddress_Unknown ,DirectAddress_Unknown,DirectAddress_Unknown,[email or web address]

## 2019-10-16 NOTE — PHYSICAL THERAPY INITIAL EVALUATION ADULT - ACTIVE RANGE OF MOTION EXAMINATION, REHAB EVAL
L UE WNL, R shouder required AAROM to flex to 90* limited by pain, distally WFL.   R  knee pt able to perform seated knee ext to full ROM, seated knee flex to approx 75* limited by pain,  L side WNL
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
## 2019-10-16 NOTE — PHYSICAL THERAPY INITIAL EVALUATION ADULT - PLANNED THERAPY INTERVENTIONS, PT EVAL
bed mobility training/transfer training/gait training/GOAL: Pt will perform 10 stairs with or without U HR as needed within 3-4weeks.

## 2019-10-16 NOTE — DISCHARGE NOTE PROVIDER - CARE PROVIDER_API CALL
Vivian Akhtar)  Neurology  General  [address] [place] [address]  Phone: [phone]  Fax: [phone]  Follow Up Time: Vivian Akhtar)  Neurology  General  [address] [place] [address]  Phone: [phone]  Fax: [phone]  Follow Up Time:     Warren Long)  Orthopaedic Surgery  [address] [place] [address] [address]  [place] [address]  Phone: [phone]  Fax: [phone]  Follow Up Time: 2 weeks    Leatha Mota)  Surgery; Surgical Critical Care  [address] [place] [address], [address] [address]  Phone: [phone]  Fax: [phone]  Follow Up Time: 1 week
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
## 2019-10-17 DIAGNOSIS — D35.2 BENIGN NEOPLASM OF PITUITARY GLAND: ICD-10-CM

## 2019-10-17 LAB
ANION GAP SERPL CALC-SCNC: 14 MMOL/L — SIGNIFICANT CHANGE UP (ref 5–17)
BASOPHILS # BLD AUTO: 0.03 K/UL — SIGNIFICANT CHANGE UP (ref 0–0.2)
BASOPHILS NFR BLD AUTO: 0.4 % — SIGNIFICANT CHANGE UP (ref 0–2)
BUN SERPL-MCNC: 13 MG/DL — SIGNIFICANT CHANGE UP (ref 7–23)
CALCIUM SERPL-MCNC: 9.3 MG/DL — SIGNIFICANT CHANGE UP (ref 8.4–10.5)
CHLORIDE SERPL-SCNC: 102 MMOL/L — SIGNIFICANT CHANGE UP (ref 96–108)
CO2 SERPL-SCNC: 24 MMOL/L — SIGNIFICANT CHANGE UP (ref 22–31)
CREAT SERPL-MCNC: 0.84 MG/DL — SIGNIFICANT CHANGE UP (ref 0.5–1.3)
EOSINOPHIL # BLD AUTO: 0.2 K/UL — SIGNIFICANT CHANGE UP (ref 0–0.5)
EOSINOPHIL NFR BLD AUTO: 2.5 % — SIGNIFICANT CHANGE UP (ref 0–6)
GLUCOSE BLDC GLUCOMTR-MCNC: 104 MG/DL — HIGH (ref 70–99)
GLUCOSE BLDC GLUCOMTR-MCNC: 117 MG/DL — HIGH (ref 70–99)
GLUCOSE BLDC GLUCOMTR-MCNC: 121 MG/DL — HIGH (ref 70–99)
GLUCOSE BLDC GLUCOMTR-MCNC: 137 MG/DL — HIGH (ref 70–99)
GLUCOSE SERPL-MCNC: 187 MG/DL — HIGH (ref 70–99)
HCT VFR BLD CALC: 33.6 % — LOW (ref 34.5–45)
HGB BLD-MCNC: 11 G/DL — LOW (ref 11.5–15.5)
IMM GRANULOCYTES NFR BLD AUTO: 0.4 % — SIGNIFICANT CHANGE UP (ref 0–1.5)
LYMPHOCYTES # BLD AUTO: 2.59 K/UL — SIGNIFICANT CHANGE UP (ref 1–3.3)
LYMPHOCYTES # BLD AUTO: 31.8 % — SIGNIFICANT CHANGE UP (ref 13–44)
MCHC RBC-ENTMCNC: 27.6 PG — SIGNIFICANT CHANGE UP (ref 27–34)
MCHC RBC-ENTMCNC: 32.7 GM/DL — SIGNIFICANT CHANGE UP (ref 32–36)
MCV RBC AUTO: 84.4 FL — SIGNIFICANT CHANGE UP (ref 80–100)
MONOCYTES # BLD AUTO: 0.59 K/UL — SIGNIFICANT CHANGE UP (ref 0–0.9)
MONOCYTES NFR BLD AUTO: 7.2 % — SIGNIFICANT CHANGE UP (ref 2–14)
NEUTROPHILS # BLD AUTO: 4.71 K/UL — SIGNIFICANT CHANGE UP (ref 1.8–7.4)
NEUTROPHILS NFR BLD AUTO: 57.7 % — SIGNIFICANT CHANGE UP (ref 43–77)
PLATELET # BLD AUTO: 257 K/UL — SIGNIFICANT CHANGE UP (ref 150–400)
POTASSIUM SERPL-MCNC: 3.7 MMOL/L — SIGNIFICANT CHANGE UP (ref 3.5–5.3)
POTASSIUM SERPL-SCNC: 3.7 MMOL/L — SIGNIFICANT CHANGE UP (ref 3.5–5.3)
RBC # BLD: 3.98 M/UL — SIGNIFICANT CHANGE UP (ref 3.8–5.2)
RBC # FLD: 14.6 % — HIGH (ref 10.3–14.5)
SODIUM SERPL-SCNC: 140 MMOL/L — SIGNIFICANT CHANGE UP (ref 135–145)
WBC # BLD: 8.15 K/UL — SIGNIFICANT CHANGE UP (ref 3.8–10.5)
WBC # FLD AUTO: 8.15 K/UL — SIGNIFICANT CHANGE UP (ref 3.8–10.5)

## 2019-10-17 RX ORDER — POTASSIUM CHLORIDE 20 MEQ
20 PACKET (EA) ORAL ONCE
Refills: 0 | Status: COMPLETED | OUTPATIENT
Start: 2019-10-17 | End: 2019-10-17

## 2019-10-17 RX ORDER — LIDOCAINE 4 G/100G
1 CREAM TOPICAL EVERY 24 HOURS
Refills: 0 | Status: DISCONTINUED | OUTPATIENT
Start: 2019-10-17 | End: 2019-10-19

## 2019-10-17 RX ORDER — NYSTATIN CREAM 100000 [USP'U]/G
1 CREAM TOPICAL
Refills: 0 | Status: DISCONTINUED | OUTPATIENT
Start: 2019-10-17 | End: 2019-10-19

## 2019-10-17 RX ADMIN — LIDOCAINE 1 PATCH: 4 CREAM TOPICAL at 00:42

## 2019-10-17 RX ADMIN — Medication 975 MILLIGRAM(S): at 12:00

## 2019-10-17 RX ADMIN — Medication 15 MILLIGRAM(S): at 18:02

## 2019-10-17 RX ADMIN — LIDOCAINE 1 PATCH: 4 CREAM TOPICAL at 23:27

## 2019-10-17 RX ADMIN — Medication 15 MILLIGRAM(S): at 08:35

## 2019-10-17 RX ADMIN — Medication 975 MILLIGRAM(S): at 02:30

## 2019-10-17 RX ADMIN — NYSTATIN CREAM 1 APPLICATION(S): 100000 CREAM TOPICAL at 21:25

## 2019-10-17 RX ADMIN — Medication 15 MILLIGRAM(S): at 16:54

## 2019-10-17 RX ADMIN — LIDOCAINE 1 PATCH: 4 CREAM TOPICAL at 06:29

## 2019-10-17 RX ADMIN — Medication 975 MILLIGRAM(S): at 01:56

## 2019-10-17 RX ADMIN — Medication 975 MILLIGRAM(S): at 20:00

## 2019-10-17 RX ADMIN — LIDOCAINE 1 PATCH: 4 CREAM TOPICAL at 12:00

## 2019-10-17 RX ADMIN — Medication 975 MILLIGRAM(S): at 19:15

## 2019-10-17 RX ADMIN — LIDOCAINE 1 PATCH: 4 CREAM TOPICAL at 19:47

## 2019-10-17 RX ADMIN — LIDOCAINE 1 PATCH: 4 CREAM TOPICAL at 00:26

## 2019-10-17 RX ADMIN — ENOXAPARIN SODIUM 40 MILLIGRAM(S): 100 INJECTION SUBCUTANEOUS at 11:18

## 2019-10-17 RX ADMIN — Medication 15 MILLIGRAM(S): at 09:10

## 2019-10-17 RX ADMIN — Medication 20 MILLIEQUIVALENT(S): at 16:54

## 2019-10-17 RX ADMIN — Medication 975 MILLIGRAM(S): at 06:00

## 2019-10-17 RX ADMIN — Medication 975 MILLIGRAM(S): at 05:29

## 2019-10-17 RX ADMIN — Medication 975 MILLIGRAM(S): at 11:18

## 2019-10-17 RX ADMIN — LIDOCAINE 1 PATCH: 4 CREAM TOPICAL at 11:19

## 2019-10-17 NOTE — PROGRESS NOTE ADULT - SUBJECTIVE AND OBJECTIVE BOX
ACS & TRAUMA SURGERY DAILY PROGRESS NOTE    SUBJECTIVE:  - Patient seen and examined at bedside during morning rounds  - Patient states feeling well, complaining of leg pain  - Yesterday patient was unable to be evaluated by PT 2/2 pain  --------------------------------------------------------------------------------------------------  OBJECTIVE:   Physical Exam:  General: AAOx3, NAD, lying comfortably in bed  HEENT: NC/AT  Respiratory: nonlabored breathing  Cardiovascular: RRR, normal S1 and S2, no murmurs or gallops  Abdomen: non-distended, soft, non-tender  Extremities: Left hand with superficial abrasion, swelling improved. Left lower leg tender to palpation  --------------------------------------------------------------------------------------------------  V/S:  Vital Signs Last 24 Hrs  T(C): 36.8 (17 Oct 2019 05:38), Max: 37.2 (16 Oct 2019 16:40)  T(F): 98.3 (17 Oct 2019 05:38), Max: 99 (16 Oct 2019 16:40)  HR: 74 (17 Oct 2019 05:38) (74 - 96)  BP: 145/80 (17 Oct 2019 05:38) (124/79 - 150/83)  BP(mean): --  RR: 18 (17 Oct 2019 05:38) (18 - 18)  SpO2: 98% (17 Oct 2019 05:38) (96% - 98%)    --------------------------------------------------------------------------------------------------  I/Os:    16 Oct 2019 07:01  -  17 Oct 2019 07:00  --------------------------------------------------------  IN:    Oral Fluid: 880 mL  Total IN: 880 mL    OUT:    Voided: 1300 mL  Total OUT: 1300 mL    Total NET: -420 mL        --------------------------------------------------------------------------------------------------  LABS:                        11.0   8.15  )-----------( 257      ( 17 Oct 2019 09:07 )             33.6     17 Oct 2019 07:22    140    |  102    |  13     ----------------------------<  187    3.7     |  24     |  0.84     Ca    9.3        17 Oct 2019 07:22        CAPILLARY BLOOD GLUCOSE      POCT Blood Glucose.: 137 mg/dL (17 Oct 2019 08:43)  POCT Blood Glucose.: 115 mg/dL (16 Oct 2019 22:41)  POCT Blood Glucose.: 175 mg/dL (16 Oct 2019 17:54)  POCT Blood Glucose.: 135 mg/dL (16 Oct 2019 12:24)  POCT Blood Glucose.: 136 mg/dL (16 Oct 2019 09:46)              --------------------------------------------------------------------------------------------------  MEDICATIONS  (STANDING):  acetaminophen   Tablet .. 975 milliGRAM(s) Oral every 6 hours  dextrose 50% Injectable 12.5 Gram(s) IV Push once  dextrose 50% Injectable 25 Gram(s) IV Push once  dextrose 50% Injectable 25 Gram(s) IV Push once  enoxaparin Injectable 40 milliGRAM(s) SubCutaneous daily  influenza   Vaccine 0.5 milliLiter(s) IntraMuscular once  insulin lispro (HumaLOG) corrective regimen sliding scale   SubCutaneous Before meals and at bedtime  ketorolac   Injectable 15 milliGRAM(s) IV Push every 8 hours  lidocaine   Patch 1 Patch Transdermal every 24 hours  lidocaine   Patch 1 Patch Transdermal every 24 hours  ondansetron Injectable 4 milliGRAM(s) IV Push once    MEDICATIONS  (PRN):  dextrose 40% Gel 15 Gram(s) Oral once PRN Blood Glucose LESS THAN 70 milliGRAM(s)/deciliter  glucagon  Injectable 1 milliGRAM(s) IntraMuscular once PRN Glucose LESS THAN 70 milligrams/deciliter
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
## 2019-10-17 NOTE — PROGRESS NOTE ADULT - SUBJECTIVE AND OBJECTIVE BOX
Orthopedics Note:    Patient reassessed as per medicine request. Patient's soft tissue shear injury of right knee is non-op and she can weight bear as tolerated. Her right shoulder imaging shows no fracture although she continues to be tender on exam with limited range of motion. This is likely from soft tissue injury, possible rotator cuff tear. This will improve with time but can obtain inpatient or outpatient MRI of R shoulder for further assessment. Discussed with Dr. Long.
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
## 2019-10-18 LAB
GLUCOSE BLDC GLUCOMTR-MCNC: 125 MG/DL — HIGH (ref 70–99)
GLUCOSE BLDC GLUCOMTR-MCNC: 128 MG/DL — HIGH (ref 70–99)
GLUCOSE BLDC GLUCOMTR-MCNC: 132 MG/DL — HIGH (ref 70–99)
GLUCOSE BLDC GLUCOMTR-MCNC: 133 MG/DL — HIGH (ref 70–99)

## 2019-10-18 PROCEDURE — 70553 MRI BRAIN STEM W/O & W/DYE: CPT | Mod: 26

## 2019-10-18 RX ORDER — ACETAMINOPHEN 500 MG
975 TABLET ORAL ONCE
Refills: 0 | Status: COMPLETED | OUTPATIENT
Start: 2019-10-18 | End: 2019-10-18

## 2019-10-18 RX ORDER — IBUPROFEN 200 MG
400 TABLET ORAL EVERY 6 HOURS
Refills: 0 | Status: DISCONTINUED | OUTPATIENT
Start: 2019-10-18 | End: 2019-10-19

## 2019-10-18 RX ORDER — ACETAMINOPHEN 500 MG
975 TABLET ORAL EVERY 6 HOURS
Refills: 0 | Status: DISCONTINUED | OUTPATIENT
Start: 2019-10-18 | End: 2019-10-19

## 2019-10-18 RX ORDER — ACETAMINOPHEN 500 MG
975 TABLET ORAL ONCE
Refills: 0 | Status: DISCONTINUED | OUTPATIENT
Start: 2019-10-18 | End: 2019-10-18

## 2019-10-18 RX ADMIN — NYSTATIN CREAM 1 APPLICATION(S): 100000 CREAM TOPICAL at 06:16

## 2019-10-18 RX ADMIN — ENOXAPARIN SODIUM 40 MILLIGRAM(S): 100 INJECTION SUBCUTANEOUS at 11:44

## 2019-10-18 RX ADMIN — LIDOCAINE 1 PATCH: 4 CREAM TOPICAL at 00:25

## 2019-10-18 RX ADMIN — LIDOCAINE 1 PATCH: 4 CREAM TOPICAL at 19:30

## 2019-10-18 RX ADMIN — LIDOCAINE 1 PATCH: 4 CREAM TOPICAL at 23:50

## 2019-10-18 RX ADMIN — NYSTATIN CREAM 1 APPLICATION(S): 100000 CREAM TOPICAL at 17:34

## 2019-10-18 RX ADMIN — LIDOCAINE 1 PATCH: 4 CREAM TOPICAL at 07:00

## 2019-10-18 RX ADMIN — Medication 975 MILLIGRAM(S): at 06:14

## 2019-10-18 RX ADMIN — LIDOCAINE 1 PATCH: 4 CREAM TOPICAL at 11:18

## 2019-10-18 RX ADMIN — Medication 975 MILLIGRAM(S): at 07:00

## 2019-10-18 RX ADMIN — Medication 0: at 22:20

## 2019-10-18 RX ADMIN — LIDOCAINE 1 PATCH: 4 CREAM TOPICAL at 23:51

## 2019-10-18 RX ADMIN — Medication 975 MILLIGRAM(S): at 20:59

## 2019-10-18 RX ADMIN — Medication 975 MILLIGRAM(S): at 20:29

## 2019-10-18 RX ADMIN — LIDOCAINE 1 PATCH: 4 CREAM TOPICAL at 11:47

## 2019-10-18 NOTE — PROGRESS NOTE ADULT - SUBJECTIVE AND OBJECTIVE BOX
ACS & TRAUMA SURGERY DAILY PROGRESS NOTE    SUBJECTIVE:  - Patient seen and examined at bedside during morning rounds  - Patient states feeling well  - Yesterday patient was unable to be evaluated by PT 2/2 pain  --------------------------------------------------------------------------------------------------  OBJECTIVE:   Physical Exam:  General: AAOx3, NAD, lying comfortably in bed  HEENT: NC/AT  Respiratory: nonlabored breathing  Cardiovascular: RRR, normal S1 and S2, no murmurs or gallops  Abdomen: non-distended, soft, non-tender  Extremities: Left hand with superficial abrasion, swelling improved. Left lower leg tender to palpation  --------------------------------------------------------------------------------------------------  V/S:    Vital Signs Last 24 Hrs  T(C): 37.1 (18 Oct 2019 04:55), Max: 37.1 (18 Oct 2019 04:55)  T(F): 98.7 (18 Oct 2019 04:55), Max: 98.7 (18 Oct 2019 04:55)  HR: 80 (18 Oct 2019 04:55) (77 - 83)  BP: 140/76 (18 Oct 2019 04:55) (137/75 - 172/83)  BP(mean): --  RR: 18 (18 Oct 2019 04:55) (18 - 18)  SpO2: 97% (18 Oct 2019 04:55) (96% - 99%)    --------------------------------------------------------------------------------------------------  I/Os:    I&O's Detail    17 Oct 2019 07:01  -  18 Oct 2019 07:00  --------------------------------------------------------  IN:    Oral Fluid: 570 mL  Total IN: 570 mL    OUT:    Voided: 1100 mL  Total OUT: 1100 mL    Total NET: -530 mL          --------------------------------------------------------------------------------------------------  LABS:                                         11.0   8.15  )-----------( 257      ( 17 Oct 2019 09:07 )             33.6            10-17    140  |  102  |  13  ----------------------------<  187<H>  3.7   |  24  |  0.84    Ca    9.3      17 Oct 2019 07:22                  --------------------------------------------------------------------------------------------------  MEDICATIONS  (STANDING):  acetaminophen   Tablet .. 975 milliGRAM(s) Oral every 6 hours  dextrose 50% Injectable 12.5 Gram(s) IV Push once  dextrose 50% Injectable 25 Gram(s) IV Push once  dextrose 50% Injectable 25 Gram(s) IV Push once  enoxaparin Injectable 40 milliGRAM(s) SubCutaneous daily  influenza   Vaccine 0.5 milliLiter(s) IntraMuscular once  insulin lispro (HumaLOG) corrective regimen sliding scale   SubCutaneous Before meals and at bedtime  ketorolac   Injectable 15 milliGRAM(s) IV Push every 8 hours  lidocaine   Patch 1 Patch Transdermal every 24 hours  lidocaine   Patch 1 Patch Transdermal every 24 hours  ondansetron Injectable 4 milliGRAM(s) IV Push once    MEDICATIONS  (PRN):  dextrose 40% Gel 15 Gram(s) Oral once PRN Blood Glucose LESS THAN 70 milliGRAM(s)/deciliter  glucagon  Injectable 1 milliGRAM(s) IntraMuscular once PRN Glucose LESS THAN 70 milligrams/deciliter
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
## 2019-10-19 ENCOUNTER — TRANSCRIPTION ENCOUNTER (OUTPATIENT)
Age: 54
End: 2019-10-19

## 2019-10-19 VITALS
DIASTOLIC BLOOD PRESSURE: 86 MMHG | TEMPERATURE: 99 F | HEART RATE: 95 BPM | OXYGEN SATURATION: 99 % | RESPIRATION RATE: 18 BRPM | SYSTOLIC BLOOD PRESSURE: 139 MMHG

## 2019-10-19 LAB
GLUCOSE BLDC GLUCOMTR-MCNC: 129 MG/DL — HIGH (ref 70–99)
GLUCOSE BLDC GLUCOMTR-MCNC: 156 MG/DL — HIGH (ref 70–99)

## 2019-10-19 PROCEDURE — 73564 X-RAY EXAM KNEE 4 OR MORE: CPT

## 2019-10-19 PROCEDURE — 90715 TDAP VACCINE 7 YRS/> IM: CPT

## 2019-10-19 PROCEDURE — 84132 ASSAY OF SERUM POTASSIUM: CPT

## 2019-10-19 PROCEDURE — 11740 EVACUATION SUBUNGUAL HMTMA: CPT | Mod: F3

## 2019-10-19 PROCEDURE — 73130 X-RAY EXAM OF HAND: CPT

## 2019-10-19 PROCEDURE — 73010 X-RAY EXAM OF SHOULDER BLADE: CPT

## 2019-10-19 PROCEDURE — 72125 CT NECK SPINE W/O DYE: CPT

## 2019-10-19 PROCEDURE — 71045 X-RAY EXAM CHEST 1 VIEW: CPT

## 2019-10-19 PROCEDURE — 73630 X-RAY EXAM OF FOOT: CPT

## 2019-10-19 PROCEDURE — 82962 GLUCOSE BLOOD TEST: CPT

## 2019-10-19 PROCEDURE — 86803 HEPATITIS C AB TEST: CPT

## 2019-10-19 PROCEDURE — 90471 IMMUNIZATION ADMIN: CPT

## 2019-10-19 PROCEDURE — 73110 X-RAY EXAM OF WRIST: CPT

## 2019-10-19 PROCEDURE — 80053 COMPREHEN METABOLIC PANEL: CPT

## 2019-10-19 PROCEDURE — 85027 COMPLETE CBC AUTOMATED: CPT

## 2019-10-19 PROCEDURE — 71260 CT THORAX DX C+: CPT

## 2019-10-19 PROCEDURE — 73590 X-RAY EXAM OF LOWER LEG: CPT

## 2019-10-19 PROCEDURE — 80048 BASIC METABOLIC PNL TOTAL CA: CPT

## 2019-10-19 PROCEDURE — 84295 ASSAY OF SERUM SODIUM: CPT

## 2019-10-19 PROCEDURE — 96374 THER/PROPH/DIAG INJ IV PUSH: CPT | Mod: XU

## 2019-10-19 PROCEDURE — 72170 X-RAY EXAM OF PELVIS: CPT

## 2019-10-19 PROCEDURE — 70553 MRI BRAIN STEM W/O & W/DYE: CPT

## 2019-10-19 PROCEDURE — 82947 ASSAY GLUCOSE BLOOD QUANT: CPT

## 2019-10-19 PROCEDURE — 83605 ASSAY OF LACTIC ACID: CPT

## 2019-10-19 PROCEDURE — 85610 PROTHROMBIN TIME: CPT

## 2019-10-19 PROCEDURE — A9585: CPT

## 2019-10-19 PROCEDURE — 73721 MRI JNT OF LWR EXTRE W/O DYE: CPT

## 2019-10-19 PROCEDURE — 85014 HEMATOCRIT: CPT

## 2019-10-19 PROCEDURE — 83036 HEMOGLOBIN GLYCOSYLATED A1C: CPT

## 2019-10-19 PROCEDURE — 99285 EMERGENCY DEPT VISIT HI MDM: CPT | Mod: 25

## 2019-10-19 PROCEDURE — 80307 DRUG TEST PRSMV CHEM ANLYZR: CPT

## 2019-10-19 PROCEDURE — 97161 PT EVAL LOW COMPLEX 20 MIN: CPT

## 2019-10-19 PROCEDURE — 82803 BLOOD GASES ANY COMBINATION: CPT

## 2019-10-19 PROCEDURE — 97530 THERAPEUTIC ACTIVITIES: CPT

## 2019-10-19 PROCEDURE — 73552 X-RAY EXAM OF FEMUR 2/>: CPT

## 2019-10-19 PROCEDURE — 83735 ASSAY OF MAGNESIUM: CPT

## 2019-10-19 PROCEDURE — 70551 MRI BRAIN STEM W/O DYE: CPT

## 2019-10-19 PROCEDURE — 73030 X-RAY EXAM OF SHOULDER: CPT

## 2019-10-19 PROCEDURE — 97166 OT EVAL MOD COMPLEX 45 MIN: CPT

## 2019-10-19 PROCEDURE — 85730 THROMBOPLASTIN TIME PARTIAL: CPT

## 2019-10-19 PROCEDURE — 84100 ASSAY OF PHOSPHORUS: CPT

## 2019-10-19 PROCEDURE — 73610 X-RAY EXAM OF ANKLE: CPT

## 2019-10-19 PROCEDURE — 74177 CT ABD & PELVIS W/CONTRAST: CPT

## 2019-10-19 PROCEDURE — 83690 ASSAY OF LIPASE: CPT

## 2019-10-19 PROCEDURE — 81003 URINALYSIS AUTO W/O SCOPE: CPT

## 2019-10-19 PROCEDURE — 82435 ASSAY OF BLOOD CHLORIDE: CPT

## 2019-10-19 PROCEDURE — 70450 CT HEAD/BRAIN W/O DYE: CPT

## 2019-10-19 PROCEDURE — 97116 GAIT TRAINING THERAPY: CPT

## 2019-10-19 PROCEDURE — 82330 ASSAY OF CALCIUM: CPT

## 2019-10-19 RX ORDER — ACETAMINOPHEN 500 MG
3 TABLET ORAL
Qty: 0 | Refills: 0 | DISCHARGE
Start: 2019-10-19

## 2019-10-19 RX ORDER — IBUPROFEN 200 MG
1 TABLET ORAL
Qty: 0 | Refills: 0 | DISCHARGE
Start: 2019-10-19

## 2019-10-19 RX ADMIN — Medication 975 MILLIGRAM(S): at 05:26

## 2019-10-19 RX ADMIN — LIDOCAINE 1 PATCH: 4 CREAM TOPICAL at 12:16

## 2019-10-19 RX ADMIN — Medication 975 MILLIGRAM(S): at 05:58

## 2019-10-19 RX ADMIN — LIDOCAINE 1 PATCH: 4 CREAM TOPICAL at 09:30

## 2019-10-19 RX ADMIN — Medication 1: at 13:17

## 2019-10-19 RX ADMIN — ENOXAPARIN SODIUM 40 MILLIGRAM(S): 100 INJECTION SUBCUTANEOUS at 12:16

## 2019-10-19 RX ADMIN — NYSTATIN CREAM 1 APPLICATION(S): 100000 CREAM TOPICAL at 05:26

## 2019-10-19 NOTE — DISCHARGE NOTE NURSING/CASE MANAGEMENT/SOCIAL WORK - NSDCFUADDAPPT_GEN_ALL_CORE_FT
Follow up with Dr. Long (Orthopaedic Surgeon) in the office in 1-2 weeks. Please call [phone].   If there is no improvement in R shoulder pain/motion, you will require MRI of R shoulder, follow up results with Dr. Long.

## 2019-10-19 NOTE — PROGRESS NOTE ADULT - PROBLEM SELECTOR PLAN 3
continue neuro cks  neuro f/u  HA  and dizziness persist but decreasing
HA  and dizziness persist but decreasing
HA  and dizziness persist but decreasing
continue neuro cks  neuro f/u
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
## 2019-10-19 NOTE — DISCHARGE NOTE NURSING/CASE MANAGEMENT/SOCIAL WORK - PATIENT PORTAL LINK FT
You can access the FollowMyHealth Patient Portal offered by [facility] by registering at the following website: [email or web address]. By joining [facility]’s FollowMyHealth portal, you will also be able to view your health information using other applications (apps) compatible with our system.
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
## 2019-10-19 NOTE — PROGRESS NOTE ADULT - PROVIDER SPECIALTY LIST ADULT
Internal Medicine
Orthopedics
Trauma Surgery
Internal Medicine
Trauma Surgery
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
## 2019-10-19 NOTE — PROGRESS NOTE ADULT - SUBJECTIVE AND OBJECTIVE BOX
ACS & TRAUMA SURGERY DAILY PROGRESS NOTE    SUBJECTIVE:  - Patient seen and examined at bedside during morning rounds  - Patient states she feels sore from working with PT yesterday    --------------------------------------------------------------------------------------------------  OBJECTIVE:   Physical Exam:  General: AAOx3, NAD, lying comfortably in bed  HEENT: NC/AT  Respiratory: nonlabored breathing  Cardiovascular: RRR  Abdomen: non-distended, soft, non-tender  Extremities: Left lower leg tender to palpation  --------------------------------------------------------------------------------------------------  V/S:    Vital Signs Last 24 Hrs  T(C): 37.3 (19 Oct 2019 04:45), Max: 37.5 (18 Oct 2019 14:41)  T(F): 99.1 (19 Oct 2019 04:45), Max: 99.5 (18 Oct 2019 14:41)  HR: 90 (19 Oct 2019 04:45) (87 - 98)  BP: 150/78 (19 Oct 2019 04:45) (129/75 - 152/83)  BP(mean): --  RR: 18 (19 Oct 2019 04:45) (18 - 18)  SpO2: 96% (19 Oct 2019 04:45) (96% - 99%)    --------------------------------------------------------------------------------------------------  I/Os:    I&O's Detail    18 Oct 2019 07:01  -  19 Oct 2019 07:00  --------------------------------------------------------  IN:    Oral Fluid: 960 mL  Total IN: 960 mL    OUT:    Voided: 1100 mL  Total OUT: 1100 mL    Total NET: -140 mL        --------------------------------------------------------------------------------------------------  LABS:                   No new labs pending    --------------------------------------------------------------------------------------------------  MEDICATIONS  (STANDING):  acetaminophen   Tablet .. 975 milliGRAM(s) Oral every 6 hours  dextrose 50% Injectable 12.5 Gram(s) IV Push once  dextrose 50% Injectable 25 Gram(s) IV Push once  dextrose 50% Injectable 25 Gram(s) IV Push once  enoxaparin Injectable 40 milliGRAM(s) SubCutaneous daily  influenza   Vaccine 0.5 milliLiter(s) IntraMuscular once  insulin lispro (HumaLOG) corrective regimen sliding scale   SubCutaneous Before meals and at bedtime  ketorolac   Injectable 15 milliGRAM(s) IV Push every 8 hours  lidocaine   Patch 1 Patch Transdermal every 24 hours  lidocaine   Patch 1 Patch Transdermal every 24 hours  ondansetron Injectable 4 milliGRAM(s) IV Push once    MEDICATIONS  (PRN):  dextrose 40% Gel 15 Gram(s) Oral once PRN Blood Glucose LESS THAN 70 milliGRAM(s)/deciliter  glucagon  Injectable 1 milliGRAM(s) IntraMuscular once PRN Glucose LESS THAN 70 milligrams/deciliter       IMAGING:    < from: MR Head w/wo IV Cont (10.18.19 @ 16:48) >  IMPRESSION: Unremarkable MRI of the brain with and without contrast with   thin sections through the sella. No masses involving the pituitary gland,   the prior appearance likely due to volume averaging with cavernous   carotid arteries.    < end of copied text >

## 2019-10-19 NOTE — PROGRESS NOTE ADULT - PROBLEM SELECTOR PLAN 2
may be due to bleeding in calves   cbc STABLE   iron supplements
may be due to bleeding in calves   cbc STABLE today  iron supplements
may be due to bleeding in calves  follow cbc  iron supplements
may be due to bleeding in calves   cbc STABLE today  iron supplements
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
## 2019-10-23 PROBLEM — J45.909 UNSPECIFIED ASTHMA, UNCOMPLICATED: Chronic | Status: ACTIVE | Noted: 2019-10-14

## 2019-10-23 PROBLEM — E11.9 TYPE 2 DIABETES MELLITUS WITHOUT COMPLICATIONS: Chronic | Status: ACTIVE | Noted: 2019-10-14

## 2019-10-24 ENCOUNTER — APPOINTMENT (OUTPATIENT)
Dept: TRAUMA SURGERY | Facility: CLINIC | Age: 54
End: 2019-10-24
Payer: COMMERCIAL

## 2019-10-24 VITALS
DIASTOLIC BLOOD PRESSURE: 88 MMHG | SYSTOLIC BLOOD PRESSURE: 131 MMHG | BODY MASS INDEX: 32.99 KG/M2 | HEART RATE: 92 BPM | TEMPERATURE: 98.4 F | WEIGHT: 198 LBS | HEIGHT: 65 IN

## 2019-10-24 PROCEDURE — 99213 OFFICE O/P EST LOW 20 MIN: CPT
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
## 2019-10-24 NOTE — ASSESSMENT
[FreeTextEntry1] : Doing well\par Lacerations healing well, sutures and staples removed.  Discussed local wound care and to return to office for signs of poor would healing or retained sutures / staples.\par Has an appointment with orthopedics (Mercedes) for follow up of knee / shoulder injuries.\par Follow up in my office as needed\par
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
## 2019-11-11 ENCOUNTER — APPOINTMENT (OUTPATIENT)
Dept: TRAUMA SURGERY | Facility: CLINIC | Age: 54
End: 2019-11-11
Payer: COMMERCIAL

## 2019-11-11 VITALS
SYSTOLIC BLOOD PRESSURE: 133 MMHG | HEART RATE: 81 BPM | RESPIRATION RATE: 18 BRPM | DIASTOLIC BLOOD PRESSURE: 92 MMHG | TEMPERATURE: 98.4 F

## 2019-11-11 PROCEDURE — 99211 OFF/OP EST MAY X REQ PHY/QHP: CPT

## 2019-11-11 NOTE — ASSESSMENT
[FreeTextEntry1] : pt has one additional proline suture\par in right arm\par I removed the sutures\par the wound is healing well

## 2020-11-06 ENCOUNTER — APPOINTMENT (OUTPATIENT)
Dept: OBGYN | Facility: CLINIC | Age: 55
End: 2020-11-06
Payer: COMMERCIAL

## 2020-11-06 VITALS
TEMPERATURE: 97.3 F | HEIGHT: 65 IN | HEART RATE: 76 BPM | WEIGHT: 209 LBS | BODY MASS INDEX: 34.82 KG/M2 | SYSTOLIC BLOOD PRESSURE: 122 MMHG | DIASTOLIC BLOOD PRESSURE: 80 MMHG | OXYGEN SATURATION: 98 %

## 2020-11-06 DIAGNOSIS — N64.9 DISORDER OF BREAST, UNSPECIFIED: ICD-10-CM

## 2020-11-06 DIAGNOSIS — Z87.42 PERSONAL HISTORY OF OTHER DISEASES OF THE FEMALE GENITAL TRACT: ICD-10-CM

## 2020-11-06 DIAGNOSIS — N63.20 UNSPECIFIED LUMP IN THE LEFT BREAST, UNSPECIFIED QUADRANT: ICD-10-CM

## 2020-11-06 PROCEDURE — 99214 OFFICE O/P EST MOD 30 MIN: CPT

## 2020-11-06 PROCEDURE — 99072 ADDL SUPL MATRL&STAF TM PHE: CPT

## 2020-11-06 RX ORDER — INSULIN GLARGINE 100 [IU]/ML
100 INJECTION, SOLUTION SUBCUTANEOUS
Refills: 0 | Status: ACTIVE | COMMUNITY

## 2020-11-06 RX ORDER — DOXYCYCLINE HYCLATE 100 MG/1
100 TABLET ORAL TWICE DAILY
Qty: 60 | Refills: 0 | Status: DISCONTINUED | COMMUNITY
Start: 2018-12-20 | End: 2020-11-06

## 2020-11-06 RX ORDER — NEOMYCIN SULFATE, POLYMYXIN B SULFATE AND HYDROCORTISONE 3.5; 10000; 1 MG/ML; [IU]/ML; MG/ML
3.5-10000-1 SOLUTION AURICULAR (OTIC)
Qty: 10 | Refills: 5 | Status: DISCONTINUED | COMMUNITY
Start: 2018-01-22 | End: 2020-11-06

## 2020-11-06 RX ORDER — DOXYCYCLINE HYCLATE 100 MG/1
100 CAPSULE ORAL
Qty: 42 | Refills: 0 | Status: DISCONTINUED | COMMUNITY
Start: 2018-12-26 | End: 2020-11-06

## 2020-11-06 RX ORDER — INSULIN ASPART 100 [IU]/ML
100 INJECTION, SOLUTION INTRAVENOUS; SUBCUTANEOUS
Refills: 0 | Status: ACTIVE | COMMUNITY

## 2020-11-06 NOTE — PHYSICAL EXAM
[Appropriately responsive] : appropriately responsive [Alert] : alert [No Acute Distress] : no acute distress [Soft] : soft [Non-tender] : non-tender [Non-distended] : non-distended [No HSM] : No HSM [No Lesions] : no lesions [No Mass] : no mass [Oriented x3] : oriented x3 [Examination Of The Breasts] : a normal appearance [Normal] : normal [No Masses] : no breast masses were palpable

## 2020-12-15 PROBLEM — Z87.09 HISTORY OF SORE THROAT: Status: RESOLVED | Noted: 2017-12-11 | Resolved: 2020-12-15

## 2020-12-21 PROBLEM — Z01.419 ENCOUNTER FOR ANNUAL ROUTINE GYNECOLOGICAL EXAMINATION: Status: RESOLVED | Noted: 2019-04-30 | Resolved: 2020-12-21
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
## 2023-05-26 ENCOUNTER — APPOINTMENT (OUTPATIENT)
Dept: CT IMAGING | Facility: CLINIC | Age: 58
End: 2023-05-26
Payer: SELF-PAY

## 2023-05-26 ENCOUNTER — OUTPATIENT (OUTPATIENT)
Dept: OUTPATIENT SERVICES | Facility: HOSPITAL | Age: 58
LOS: 1 days | End: 2023-05-26
Payer: SELF-PAY

## 2023-05-26 DIAGNOSIS — E78.5 HYPERLIPIDEMIA, UNSPECIFIED: ICD-10-CM

## 2023-05-26 DIAGNOSIS — Z98.89 OTHER SPECIFIED POSTPROCEDURAL STATES: Chronic | ICD-10-CM

## 2023-05-26 DIAGNOSIS — Z98.891 HISTORY OF UTERINE SCAR FROM PREVIOUS SURGERY: Chronic | ICD-10-CM

## 2023-05-26 PROCEDURE — 75571 CT HRT W/O DYE W/CA TEST: CPT

## 2023-05-26 PROCEDURE — 75571 CT HRT W/O DYE W/CA TEST: CPT | Mod: 26

## 2023-11-15 ENCOUNTER — OUTPATIENT (OUTPATIENT)
Dept: OUTPATIENT SERVICES | Facility: HOSPITAL | Age: 58
LOS: 1 days | End: 2023-11-15
Payer: COMMERCIAL

## 2023-11-15 ENCOUNTER — APPOINTMENT (OUTPATIENT)
Dept: CV DIAGNOSTICS | Facility: HOSPITAL | Age: 58
End: 2023-11-15

## 2023-11-15 DIAGNOSIS — R07.9 CHEST PAIN, UNSPECIFIED: ICD-10-CM

## 2023-11-15 DIAGNOSIS — Z98.891 HISTORY OF UTERINE SCAR FROM PREVIOUS SURGERY: Chronic | ICD-10-CM

## 2023-11-15 DIAGNOSIS — Z98.89 OTHER SPECIFIED POSTPROCEDURAL STATES: Chronic | ICD-10-CM

## 2023-11-15 PROCEDURE — 93016 CV STRESS TEST SUPVJ ONLY: CPT | Mod: MH

## 2023-11-15 PROCEDURE — 93017 CV STRESS TEST TRACING ONLY: CPT

## 2023-11-15 PROCEDURE — 93018 CV STRESS TEST I&R ONLY: CPT | Mod: MH

## 2023-11-15 PROCEDURE — 78452 HT MUSCLE IMAGE SPECT MULT: CPT | Mod: 26,MH

## 2023-11-15 PROCEDURE — 78452 HT MUSCLE IMAGE SPECT MULT: CPT | Mod: MH

## 2023-11-15 PROCEDURE — A9500: CPT

## 2024-07-17 ENCOUNTER — NON-APPOINTMENT (OUTPATIENT)
Age: 59
End: 2024-07-17

## 2024-07-18 ENCOUNTER — APPOINTMENT (OUTPATIENT)
Dept: ORTHOPEDIC SURGERY | Facility: CLINIC | Age: 59
End: 2024-07-18
Payer: COMMERCIAL

## 2024-07-18 VITALS — HEIGHT: 66 IN | BODY MASS INDEX: 35.36 KG/M2 | WEIGHT: 220 LBS

## 2024-07-18 VITALS — HEART RATE: 64 BPM | SYSTOLIC BLOOD PRESSURE: 115 MMHG | DIASTOLIC BLOOD PRESSURE: 73 MMHG

## 2024-07-18 DIAGNOSIS — S46.219A STRAIN OF MUSCLE, FASCIA AND TENDON OF OTHER PARTS OF BICEPS, UNSPECIFIED ARM, INITIAL ENCOUNTER: ICD-10-CM

## 2024-07-18 DIAGNOSIS — M67.911 UNSPECIFIED DISORDER OF SYNOVIUM AND TENDON, RIGHT SHOULDER: ICD-10-CM

## 2024-07-18 PROCEDURE — 99204 OFFICE O/P NEW MOD 45 MIN: CPT

## 2024-07-18 RX ORDER — IBUPROFEN 800 MG/1
800 TABLET, FILM COATED ORAL 3 TIMES DAILY
Qty: 90 | Refills: 0 | Status: ACTIVE | COMMUNITY
Start: 2024-07-18 | End: 1900-01-01

## 2024-09-19 ENCOUNTER — APPOINTMENT (OUTPATIENT)
Dept: ORTHOPEDIC SURGERY | Facility: HOSPITAL | Age: 59
End: 2024-09-19

## 2024-10-04 ENCOUNTER — APPOINTMENT (OUTPATIENT)
Dept: ORTHOPEDIC SURGERY | Facility: CLINIC | Age: 59
End: 2024-10-04

## 2025-02-24 NOTE — OCCUPATIONAL THERAPY INITIAL EVALUATION ADULT - ASSISTIVE DEVICE:SUPINE/SIT, REHAB EVAL
Detail Level: Detailed Medical Necessity Information: It is in your best interest to select a reason for this procedure from the list below. All of these items fulfill various CMS LCD requirements except the new and changing color options. Render Note In Bullet Format When Appropriate: No Show Aperture Variable?: Yes Medical Necessity Clause: This procedure was medically necessary because the lesions that were treated were: Post-Care Instructions: I reviewed with the patient in detail post-care instructions. Patient is to wear sunprotection, and avoid picking at any of the treated lesions. Pt may apply Vaseline to crusted or scabbing areas. Spray Paint Text: The liquid nitrogen was applied to the skin utilizing a spray paint frosting technique. Consent: The patient's consent was obtained including but not limited to risks of crusting, scabbing, blistering, scarring, darker or lighter pigmentary change, recurrence, incomplete removal and infection. bed rails

## 2025-06-19 NOTE — OCCUPATIONAL THERAPY INITIAL EVALUATION ADULT - GROOMING, PREVIOUS LEVEL OF FUNCTION, OT EVAL
Pt is requesting Judith HWANG to place lab orders so that her iron level can be check to see if she needs more infusions sooner than later. Pt would like a call back at [phone]. Thank you.   independent